# Patient Record
Sex: FEMALE | Race: ASIAN | NOT HISPANIC OR LATINO | Employment: FULL TIME | URBAN - METROPOLITAN AREA
[De-identification: names, ages, dates, MRNs, and addresses within clinical notes are randomized per-mention and may not be internally consistent; named-entity substitution may affect disease eponyms.]

---

## 2021-08-30 ENCOUNTER — APPOINTMENT (OUTPATIENT)
Dept: RADIOLOGY | Facility: MEDICAL CENTER | Age: 31
End: 2021-08-30
Payer: COMMERCIAL

## 2021-08-30 DIAGNOSIS — M99.02 THORACIC SEGMENT DYSFUNCTION: ICD-10-CM

## 2021-08-30 DIAGNOSIS — M99.04 SOMATIC DYSFUNCTION OF SACRAL REGION: ICD-10-CM

## 2021-08-30 DIAGNOSIS — M99.03 SOMATIC DYSFUNCTION OF LUMBAR REGION: ICD-10-CM

## 2021-08-30 DIAGNOSIS — S33.8XXA SACRUM SPRAIN, INITIAL ENCOUNTER: ICD-10-CM

## 2021-08-30 PROCEDURE — 72100 X-RAY EXAM L-S SPINE 2/3 VWS: CPT

## 2021-08-30 PROCEDURE — 72170 X-RAY EXAM OF PELVIS: CPT

## 2021-10-13 ENCOUNTER — CONSULT (OUTPATIENT)
Dept: PAIN MEDICINE | Facility: MEDICAL CENTER | Age: 31
End: 2021-10-13
Payer: COMMERCIAL

## 2021-10-13 VITALS
HEART RATE: 76 BPM | DIASTOLIC BLOOD PRESSURE: 75 MMHG | WEIGHT: 137 LBS | SYSTOLIC BLOOD PRESSURE: 112 MMHG | BODY MASS INDEX: 24.27 KG/M2 | HEIGHT: 63 IN

## 2021-10-13 DIAGNOSIS — G89.4 CHRONIC PAIN SYNDROME: Primary | ICD-10-CM

## 2021-10-13 DIAGNOSIS — M51.26 LUMBAR DISC HERNIATION: ICD-10-CM

## 2021-10-13 DIAGNOSIS — M51.16 LUMBAR DISC DISEASE WITH RADICULOPATHY: ICD-10-CM

## 2021-10-13 PROCEDURE — 99204 OFFICE O/P NEW MOD 45 MIN: CPT | Performed by: PHYSICAL MEDICINE & REHABILITATION

## 2021-10-13 RX ORDER — LATANOPROST 50 UG/ML
1 SOLUTION/ DROPS OPHTHALMIC
COMMUNITY
End: 2021-12-07

## 2021-10-29 ENCOUNTER — HOSPITAL ENCOUNTER (OUTPATIENT)
Dept: MRI IMAGING | Facility: HOSPITAL | Age: 31
Discharge: HOME | End: 2021-10-29
Attending: PHYSICAL MEDICINE & REHABILITATION
Payer: COMMERCIAL

## 2021-10-29 DIAGNOSIS — G89.4 CHRONIC PAIN SYNDROME: ICD-10-CM

## 2021-10-29 DIAGNOSIS — M51.26 LUMBAR DISC HERNIATION: ICD-10-CM

## 2021-10-29 DIAGNOSIS — M51.16 LUMBAR DISC DISEASE WITH RADICULOPATHY: ICD-10-CM

## 2021-10-29 PROCEDURE — 72148 MRI LUMBAR SPINE W/O DYE: CPT

## 2021-10-29 PROCEDURE — G1004 CDSM NDSC: HCPCS

## 2021-11-02 ENCOUNTER — TELEPHONE (OUTPATIENT)
Dept: PAIN MEDICINE | Facility: CLINIC | Age: 31
End: 2021-11-02

## 2021-12-01 ENCOUNTER — IMMUNIZATIONS (OUTPATIENT)
Dept: FAMILY MEDICINE CLINIC | Facility: HOSPITAL | Age: 31
End: 2021-12-01

## 2021-12-01 DIAGNOSIS — Z23 ENCOUNTER FOR IMMUNIZATION: Primary | ICD-10-CM

## 2021-12-01 PROCEDURE — 0064A COVID-19 MODERNA VACC 0.25 ML BOOSTER: CPT

## 2021-12-01 PROCEDURE — 91306 COVID-19 MODERNA VACC 0.25 ML BOOSTER: CPT

## 2021-12-07 ENCOUNTER — CONSULT (OUTPATIENT)
Dept: NEUROSURGERY | Facility: CLINIC | Age: 31
End: 2021-12-07
Payer: COMMERCIAL

## 2021-12-07 VITALS
SYSTOLIC BLOOD PRESSURE: 124 MMHG | BODY MASS INDEX: 32.07 KG/M2 | HEIGHT: 63 IN | TEMPERATURE: 98 F | HEART RATE: 68 BPM | WEIGHT: 181 LBS | DIASTOLIC BLOOD PRESSURE: 73 MMHG | RESPIRATION RATE: 16 BRPM

## 2021-12-07 DIAGNOSIS — M48.061 SPINAL STENOSIS OF LUMBAR REGION WITHOUT NEUROGENIC CLAUDICATION: ICD-10-CM

## 2021-12-07 DIAGNOSIS — M51.26 LUMBAR DISC HERNIATION: ICD-10-CM

## 2021-12-07 DIAGNOSIS — G89.4 CHRONIC PAIN SYNDROME: ICD-10-CM

## 2021-12-07 PROCEDURE — 99203 OFFICE O/P NEW LOW 30 MIN: CPT | Performed by: NEUROLOGICAL SURGERY

## 2021-12-07 RX ORDER — RIMEGEPANT SULFATE 75 MG/75MG
75 TABLET, ORALLY DISINTEGRATING ORAL
COMMUNITY
Start: 2021-10-18

## 2021-12-07 RX ORDER — LATANOPROST 50 UG/ML
SOLUTION/ DROPS OPHTHALMIC
COMMUNITY
Start: 2021-09-29

## 2022-03-16 ENCOUNTER — APPOINTMENT (OUTPATIENT)
Dept: RADIOLOGY | Facility: MEDICAL CENTER | Age: 32
End: 2022-03-16
Payer: COMMERCIAL

## 2022-03-16 ENCOUNTER — OFFICE VISIT (OUTPATIENT)
Dept: FAMILY MEDICINE CLINIC | Facility: CLINIC | Age: 32
End: 2022-03-16
Payer: COMMERCIAL

## 2022-03-16 VITALS
DIASTOLIC BLOOD PRESSURE: 68 MMHG | WEIGHT: 187 LBS | SYSTOLIC BLOOD PRESSURE: 122 MMHG | BODY MASS INDEX: 33.13 KG/M2 | TEMPERATURE: 97.4 F | HEIGHT: 63 IN | HEART RATE: 82 BPM

## 2022-03-16 DIAGNOSIS — R06.02 SOB (SHORTNESS OF BREATH): ICD-10-CM

## 2022-03-16 DIAGNOSIS — M51.37 DDD (DEGENERATIVE DISC DISEASE), LUMBOSACRAL: ICD-10-CM

## 2022-03-16 DIAGNOSIS — G43.909 MIGRAINE WITHOUT STATUS MIGRAINOSUS, NOT INTRACTABLE, UNSPECIFIED MIGRAINE TYPE: ICD-10-CM

## 2022-03-16 DIAGNOSIS — Z11.59 NEED FOR HEPATITIS C SCREENING TEST: Primary | ICD-10-CM

## 2022-03-16 DIAGNOSIS — R63.5 WEIGHT GAIN: ICD-10-CM

## 2022-03-16 DIAGNOSIS — F41.1 ANXIETY, GENERALIZED: ICD-10-CM

## 2022-03-16 DIAGNOSIS — Z13.220 LIPID SCREENING: ICD-10-CM

## 2022-03-16 DIAGNOSIS — Z87.891 HISTORY OF TOBACCO USE: ICD-10-CM

## 2022-03-16 DIAGNOSIS — F33.9 DEPRESSION, RECURRENT (HCC): ICD-10-CM

## 2022-03-16 DIAGNOSIS — Z00.00 HEALTHCARE MAINTENANCE: ICD-10-CM

## 2022-03-16 DIAGNOSIS — K58.2 IRRITABLE BOWEL SYNDROME WITH BOTH CONSTIPATION AND DIARRHEA: ICD-10-CM

## 2022-03-16 DIAGNOSIS — Z11.4 SCREENING FOR HIV (HUMAN IMMUNODEFICIENCY VIRUS): ICD-10-CM

## 2022-03-16 PROBLEM — M53.86 SCIATICA OF LEFT SIDE ASSOCIATED WITH DISORDER OF LUMBAR SPINE: Status: ACTIVE | Noted: 2022-03-16

## 2022-03-16 PROBLEM — R10.2 PELVIC PAIN IN FEMALE: Status: ACTIVE | Noted: 2020-01-30

## 2022-03-16 PROBLEM — N92.4 MENORRHAGIA, PREMENOPAUSAL: Status: RESOLVED | Noted: 2020-01-30 | Resolved: 2022-03-16

## 2022-03-16 PROBLEM — M51.379 DDD (DEGENERATIVE DISC DISEASE), LUMBOSACRAL: Status: ACTIVE | Noted: 2018-04-20

## 2022-03-16 PROBLEM — I10 HYPERTENSION: Status: ACTIVE | Noted: 2022-03-16

## 2022-03-16 PROBLEM — M51.16 LUMBAR DISC HERNIATION WITH RADICULOPATHY: Status: ACTIVE | Noted: 2017-04-01

## 2022-03-16 PROBLEM — N92.4 MENORRHAGIA, PREMENOPAUSAL: Status: ACTIVE | Noted: 2020-01-30

## 2022-03-16 PROBLEM — I10 HYPERTENSION: Status: RESOLVED | Noted: 2022-03-16 | Resolved: 2022-03-16

## 2022-03-16 PROCEDURE — 93000 ELECTROCARDIOGRAM COMPLETE: CPT | Performed by: PHYSICIAN ASSISTANT

## 2022-03-16 PROCEDURE — 71046 X-RAY EXAM CHEST 2 VIEWS: CPT

## 2022-03-16 PROCEDURE — 99204 OFFICE O/P NEW MOD 45 MIN: CPT | Performed by: PHYSICIAN ASSISTANT

## 2022-03-16 PROCEDURE — 3008F BODY MASS INDEX DOCD: CPT | Performed by: PHYSICIAN ASSISTANT

## 2022-03-16 PROCEDURE — 1036F TOBACCO NON-USER: CPT | Performed by: PHYSICIAN ASSISTANT

## 2022-03-16 PROCEDURE — 3725F SCREEN DEPRESSION PERFORMED: CPT | Performed by: PHYSICIAN ASSISTANT

## 2022-03-16 RX ORDER — OFLOXACIN 3 MG/ML
SOLUTION/ DROPS OPHTHALMIC
COMMUNITY
End: 2022-03-16 | Stop reason: ALTCHOICE

## 2022-03-16 NOTE — ASSESSMENT & PLAN NOTE
Currently stable with 3 months of therapy  Patient is declining medication at this time  No SI or HI

## 2022-03-16 NOTE — ASSESSMENT & PLAN NOTE
Patient is status post injection and has seen neurosurgery    In the future they recommend diskectomy if symptoms exacerbate or persist

## 2022-03-16 NOTE — PATIENT INSTRUCTIONS
Problem List Items Addressed This Visit        Digestive    Irritable bowel syndrome with both constipation and diarrhea     Currently stable  Cardiovascular and Mediastinum    Migraine headache     Very stable with the use of Nurtec 3 to 4 times a month  Musculoskeletal and Integument    DDD (degenerative disc disease), lumbosacral     Patient is status post injection and has seen neurosurgery  In the future they recommend diskectomy if symptoms exacerbate or persist             Other    Anxiety, generalized     Currently stable with 3 months of therapy  Patient is declining medication at this time  No SI or HI  Relevant Orders    POCT ECG (Completed)    History of tobacco use     History of tobacco abuse with shortness of breath- check chest x-ray  SOB (shortness of breath)     Symptoms intermittent at rest   This is atypical   Check blood work including D-dimer chest x-ray and echo  EKG in office WNL today  Relevant Orders    CBC and differential    Comprehensive metabolic panel    TSH, 3rd generation with Free T4 reflex    XR chest pa & lateral    Echo complete w/ contrast if indicated    D-dimer, quantitative    Depression, recurrent (HCC)     NO SI or HI  Meds not needed at this time  Pt is to continue therapy and in more time if she feels she is not making appropriate progress she would consider the next step              Other Visit Diagnoses     Need for hepatitis C screening test    -  Primary    Relevant Orders    Hepatitis C Antibody ([facility])    Screening for HIV (human immunodeficiency virus)        Relevant Orders    HIV 1/2 Antigen/Antibody (4th Generation) w Reflex [facility]    Weight gain        Relevant Orders    CBC and differential    Comprehensive metabolic panel    Lipid panel    TSH, 3rd generation with Free T4 reflex    Healthcare maintenance        Relevant Orders    Ambulatory Referral to Obstetrics / Gynecology    Lipid screening Relevant Orders    Lipid panel

## 2022-03-16 NOTE — ASSESSMENT & PLAN NOTE
Symptoms intermittent at rest   This is atypical   Check blood work including D-dimer chest x-ray and echo  EKG in office WNL today

## 2022-03-16 NOTE — PROGRESS NOTES
Assessment and Plan:    Problem List Items Addressed This Visit        Digestive    Irritable bowel syndrome with both constipation and diarrhea     Currently stable  Cardiovascular and Mediastinum    Migraine headache     Very stable with the use of Nurtec 3 to 4 times a month  Musculoskeletal and Integument    DDD (degenerative disc disease), lumbosacral     Patient is status post injection and has seen neurosurgery  In the future they recommend diskectomy if symptoms exacerbate or persist             Other    Anxiety, generalized     Currently stable with 3 months of therapy  Patient is declining medication at this time  No SI or HI  Relevant Orders    POCT ECG (Completed)    History of tobacco use     History of tobacco abuse with shortness of breath- check chest x-ray  SOB (shortness of breath)     Symptoms intermittent at rest   This is atypical   Check blood work including D-dimer chest x-ray and echo  EKG in office WNL today  Relevant Orders    CBC and differential    Comprehensive metabolic panel    TSH, 3rd generation with Free T4 reflex    XR chest pa & lateral    Echo complete w/ contrast if indicated    D-dimer, quantitative    Depression, recurrent (HCC)     NO SI or HI  Meds not needed at this time  Pt is to continue therapy and in more time if she feels she is not making appropriate progress she would consider the next step              Other Visit Diagnoses     Need for hepatitis C screening test    -  Primary    Relevant Orders    Hepatitis C Antibody ([facility])    Screening for HIV (human immunodeficiency virus)        Relevant Orders    HIV 1/2 Antigen/Antibody (4th Generation) w Reflex [facility]    Weight gain        Relevant Orders    CBC and differential    Comprehensive metabolic panel    Lipid panel    TSH, 3rd generation with Free T4 reflex    Healthcare maintenance        Relevant Orders    Ambulatory Referral to Obstetrics / Gynecology Lipid screening        Relevant Orders    Lipid panel                 Diagnoses and all orders for this visit:    Need for hepatitis C screening test  -     Hepatitis C Antibody ([facility]); Future    Screening for HIV (human immunodeficiency virus)  -     HIV 1/2 Antigen/Antibody (4th Generation) w Reflex [facility]; Future    Weight gain  -     CBC and differential  -     Comprehensive metabolic panel  -     Lipid panel  -     TSH, 3rd generation with Free T4 reflex    Healthcare maintenance  -     Ambulatory Referral to Obstetrics / Gynecology; Future    Lipid screening  -     Lipid panel    SOB (shortness of breath)  -     CBC and differential  -     Comprehensive metabolic panel  -     TSH, 3rd generation with Free T4 reflex  -     XR chest pa & lateral; Future  -     Echo complete w/ contrast if indicated; Future  -     D-dimer, quantitative; Future    Anxiety, generalized  -     POCT ECG    History of tobacco use    Migraine without status migrainosus, not intractable, unspecified migraine type    Irritable bowel syndrome with both constipation and diarrhea    DDD (degenerative disc disease), lumbosacral    Depression, recurrent (HCC)    Other orders  -     Discontinue: ofloxacin (OCUFLOX) 0 3 % ophthalmic solution; ofloxacin 0 3 % eye drops   INSTILL 1 DROP INTO AFFECTED EYE(S) BY OPHTHALMIC ROUTE 4 TIMES PER DAY              Subjective:      Patient ID: Malinda Pan is a 32 y o  female  CC:    Chief Complaint   Patient presents with    Establish Care     Pt is here to establish care, states she has not had an exam in the past 2/3 years  Was seen in [address] [place] [address] before this   Shortness of Breath     not related to physical activity, ongoing for the past 6 months   Weight Check     "weight gain"     Blood Pressure Check     states she has had concerning b/p readings in the past, does check b/p at home with elevated reading   fam hx of hypertension     Anxiety    Depression     does got to therapist  + depression screening  HPI:    NP here to be established  Patient moved to the [facility] from [place] and this summer she does has not been able to get in touch with an hooked up with a primary care provider  She has history of anxiety she has been through counseling now continually for the past 3 months and she states that she would like to see how this works before she with even think about starting medication  No SI or HI even though she states she is a little bit depressed  Her family has been on our half away from this area she moved here due to her boyfriend whose family is also not in the area  Patient has noted that over time she is gaining weight  Blood work in the system I can see from 2 years ago was normal but she is looking to get a new fresh set  Patient has a history of smoking for 10 years but has been a nonsmoker for quite some time now  One of her main complaints today also is that she is feeling this sensation of being short of breath mostly while at rest and not upon exertion she states it is really difficult to describe but she feels that something is in her lungs when she feels short of breath and it feels difficult to breathe although she is not coughing wheezing note palpitations racing heart nausea sweats lightheadedness dizziness or headaches during this time she states that last week was the worst episode she had lasted about 12 hours  She states again that she has no chest pain or chest tightness but just feels that something is in her chest     Also patient does have anxiety and when she checks her blood pressure at home her blood pressure readings on her home cuff are extremely elevated    She states that even when she goes the doctor if she has been rushing around trying to catch transportation before she has her blood pressure taken it is elevated and then when she becomes calmer it does go back down to normal       The following portions of the patient's history were reviewed and updated as appropriate: allergies, current medications, past family history, past medical history, past social history, past surgical history and problem list       Review of Systems   Constitutional: Positive for unexpected weight change  HENT: Negative  Eyes: Negative  Respiratory: Positive for shortness of breath  Cardiovascular: Negative  Gastrointestinal: Negative  Endocrine: Negative  Genitourinary: Negative  Musculoskeletal: Negative  Skin: Negative  Allergic/Immunologic: Negative  Neurological: Negative  Hematological: Negative  Psychiatric/Behavioral: Negative  Data to review:       Objective:    Vitals:    03/16/22 1021   BP: 122/68   BP Location: Right arm   Patient Position: Sitting   Cuff Size: Adult   Pulse: 82   Temp: (!) 97 4 °F (36 3 °C)   TempSrc: Temporal   Weight: 84 8 kg (187 lb)   Height: 5' 3" (1 6 m)        Physical Exam  Vitals and nursing note reviewed  Constitutional:       Appearance: Normal appearance  She is well-developed  HENT:      Head: Normocephalic and atraumatic  Eyes:      General: Lids are normal       Conjunctiva/sclera: Conjunctivae normal       Pupils: Pupils are equal, round, and reactive to light  Cardiovascular:      Rate and Rhythm: Normal rate and regular rhythm  Heart sounds: No murmur heard  Pulmonary:      Effort: Pulmonary effort is normal       Breath sounds: Normal breath sounds  Skin:     General: Skin is warm and dry  Neurological:      General: No focal deficit present  Mental Status: She is alert  Coordination: Coordination is intact  Psychiatric:         Mood and Affect: Mood normal          Behavior: Behavior normal  Behavior is cooperative  Thought Content: Thought content normal          Judgment: Judgment normal          BMI Counseling: Body mass index is 33 13 kg/m²   The BMI is above normal  Nutrition recommendations include reducing portion sizes, decreasing overall calorie intake, 3-5 servings of fruits/vegetables daily, reducing fast food intake, consuming healthier snacks, decreasing soda and/or juice intake, moderation in carbohydrate intake, increasing intake of lean protein, reducing intake of saturated fat and trans fat and reducing intake of cholesterol  Depression Screening Follow-up Plan: Patient's depression screening was positive with a PHQ-2 score of 4  Their PHQ-9 score was 19  Patient advised to follow-up with PCP for further management  BMI Counseling: Body mass index is 33 13 kg/m²  The BMI is above normal  Nutrition recommendations include decreasing portion sizes, encouraging healthy choices of fruits and vegetables, decreasing fast food intake, consuming healthier snacks, limiting drinks that contain sugar, moderation in carbohydrate intake, increasing intake of lean protein, reducing intake of saturated and trans fat and reducing intake of cholesterol  Exercise recommendations include exercising 3-5 times per week  No pharmacotherapy was ordered  Rationale for BMI follow-up plan is due to patient being overweight or obese  Depression Screening and Follow-up Plan: Patient's depression screening was positive with a PHQ-2 score of 4  Their PHQ-9 score was 19  Patient assessed for underlying major depression  Brief counseling provided and recommend additional follow-up/re-evaluation next office visit

## 2022-03-16 NOTE — ASSESSMENT & PLAN NOTE
NO SI or HI  Meds not needed at this time  Pt is to continue therapy and in more time if she feels she is not making appropriate progress she would consider the next step

## 2022-04-01 LAB
ALBUMIN SERPL-MCNC: 4.4 G/DL (ref 3.8–4.8)
ALBUMIN/GLOB SERPL: 1.5 {RATIO} (ref 1.2–2.2)
ALP SERPL-CCNC: 71 IU/L (ref 44–121)
ALT SERPL-CCNC: 18 IU/L (ref 0–32)
AST SERPL-CCNC: 22 IU/L (ref 0–40)
BASOPHILS # BLD AUTO: 0 X10E3/UL (ref 0–0.2)
BASOPHILS NFR BLD AUTO: 1 %
BILIRUB SERPL-MCNC: 0.3 MG/DL (ref 0–1.2)
BUN SERPL-MCNC: 14 MG/DL (ref 6–20)
BUN/CREAT SERPL: 15 (ref 9–23)
CALCIUM SERPL-MCNC: 9.4 MG/DL (ref 8.7–10.2)
CHLORIDE SERPL-SCNC: 101 MMOL/L (ref 96–106)
CHOLEST SERPL-MCNC: 173 MG/DL (ref 100–199)
CO2 SERPL-SCNC: 18 MMOL/L (ref 20–29)
CREAT SERPL-MCNC: 0.92 MG/DL (ref 0.57–1)
D DIMER PPP FEU-MCNC: 0.31 MG/L FEU (ref 0–0.49)
EGFR: 85 ML/MIN/1.73
EOSINOPHIL # BLD AUTO: 0.1 X10E3/UL (ref 0–0.4)
EOSINOPHIL NFR BLD AUTO: 1 %
ERYTHROCYTE [DISTWIDTH] IN BLOOD BY AUTOMATED COUNT: 12.7 % (ref 11.7–15.4)
GLOBULIN SER-MCNC: 2.9 G/DL (ref 1.5–4.5)
GLUCOSE SERPL-MCNC: 81 MG/DL (ref 65–99)
HCT VFR BLD AUTO: 39.6 % (ref 34–46.6)
HCV AB S/CO SERPL IA: <0.1 S/CO RATIO (ref 0–0.9)
HDLC SERPL-MCNC: 57 MG/DL
HGB BLD-MCNC: 13.6 G/DL (ref 11.1–15.9)
HIV 1+2 AB+HIV1 P24 AG SERPL QL IA: NON REACTIVE
IMM GRANULOCYTES # BLD: 0 X10E3/UL (ref 0–0.1)
IMM GRANULOCYTES NFR BLD: 0 %
LDLC SERPL CALC-MCNC: 96 MG/DL (ref 0–99)
LYMPHOCYTES # BLD AUTO: 2.2 X10E3/UL (ref 0.7–3.1)
LYMPHOCYTES NFR BLD AUTO: 29 %
MCH RBC QN AUTO: 30.4 PG (ref 26.6–33)
MCHC RBC AUTO-ENTMCNC: 34.3 G/DL (ref 31.5–35.7)
MCV RBC AUTO: 88 FL (ref 79–97)
MONOCYTES # BLD AUTO: 0.4 X10E3/UL (ref 0.1–0.9)
MONOCYTES NFR BLD AUTO: 6 %
NEUTROPHILS # BLD AUTO: 4.9 X10E3/UL (ref 1.4–7)
NEUTROPHILS NFR BLD AUTO: 63 %
PLATELET # BLD AUTO: 333 X10E3/UL (ref 150–450)
POTASSIUM SERPL-SCNC: 4.4 MMOL/L (ref 3.5–5.2)
PROT SERPL-MCNC: 7.3 G/DL (ref 6–8.5)
RBC # BLD AUTO: 4.48 X10E6/UL (ref 3.77–5.28)
SL AMB VLDL CHOLESTEROL CALC: 20 MG/DL (ref 5–40)
SODIUM SERPL-SCNC: 136 MMOL/L (ref 134–144)
TRIGL SERPL-MCNC: 111 MG/DL (ref 0–149)
TSH SERPL DL<=0.005 MIU/L-ACNC: 2.5 UIU/ML (ref 0.45–4.5)
WBC # BLD AUTO: 7.7 X10E3/UL (ref 3.4–10.8)

## 2022-04-03 NOTE — RESULT ENCOUNTER NOTE
Please let pt know her blood work was great  Next item to review is her ECHO was is scheduled  CXR was WNL

## 2022-04-05 ENCOUNTER — HOSPITAL ENCOUNTER (OUTPATIENT)
Dept: NON INVASIVE DIAGNOSTICS | Facility: CLINIC | Age: 32
Discharge: HOME | End: 2022-04-05
Payer: COMMERCIAL

## 2022-04-05 VITALS
WEIGHT: 187 LBS | DIASTOLIC BLOOD PRESSURE: 68 MMHG | HEART RATE: 80 BPM | BODY MASS INDEX: 33.13 KG/M2 | HEIGHT: 63 IN | SYSTOLIC BLOOD PRESSURE: 122 MMHG

## 2022-04-05 DIAGNOSIS — R06.02 SOB (SHORTNESS OF BREATH): ICD-10-CM

## 2022-04-05 LAB
AORTIC ROOT: 2.4 CM
APICAL FOUR CHAMBER EJECTION FRACTION: 55 %
AV REGURGITATION PRESSURE HALF TIME: 465 MS
E WAVE DECELERATION TIME: 198 MS
FRACTIONAL SHORTENING: 29 % (ref 28–44)
INTERVENTRICULAR SEPTUM IN DIASTOLE (PARASTERNAL SHORT AXIS VIEW): 0.8 CM
INTERVENTRICULAR SEPTUM: 0.8 CM (ref 0.53–0.99)
LEFT ATRIUM AREA SYSTOLE SINGLE PLANE A4C: 18.9 CM2
LEFT ATRIUM SIZE: 3.1 CM
LEFT INTERNAL DIMENSION IN SYSTOLE: 3.2 CM (ref 2.86–4.32)
LEFT VENTRICULAR INTERNAL DIMENSION IN DIASTOLE: 4.5 CM (ref 4.69–6.98)
LEFT VENTRICULAR POSTERIOR WALL IN END DIASTOLE: 0.8 CM (ref 0.52–0.98)
LEFT VENTRICULAR STROKE VOLUME: 54 ML
LVSV (TEICH): 54 ML
MV E'TISSUE VEL-SEP: 14 CM/S
MV PEAK A VEL: 0.63 M/S
MV PEAK E VEL: 96 CM/S
MV STENOSIS PRESSURE HALF TIME: 57 MS
MV VALVE AREA P 1/2 METHOD: 3.86 CM2
PULMONARY REGURGITATION LATE DIASTOLIC VELOCITY: 0.02 M/S
RIGHT ATRIUM AREA SYSTOLE A4C: 14.2 CM2
RIGHT VENTRICLE ID DIMENSION: 3.5 CM
SL CV AV DECELERATION TIME RETROGRADE: 1604 MS
SL CV AV PEAK GRADIENT RETROGRADE: 79 MMHG
SL CV LV EF: 60
SL CV PED ECHO LEFT VENTRICLE DIASTOLIC VOLUME (MOD BIPLANE) 2D: 93 ML
SL CV PED ECHO LEFT VENTRICLE SYSTOLIC VOLUME (MOD BIPLANE) 2D: 40 ML
TR MAX PG: 27 MMHG
TR PEAK VELOCITY: 2.6 M/S
TRICUSPID VALVE PEAK REGURGITATION VELOCITY: 2.58 M/S
Z-SCORE OF INTERVENTRICULAR SEPTUM IN END DIASTOLE: 0.32
Z-SCORE OF LEFT VENTRICULAR DIMENSION IN END DIASTOLE: -2.39
Z-SCORE OF LEFT VENTRICULAR DIMENSION IN END SYSTOLE: -0.74
Z-SCORE OF LEFT VENTRICULAR POSTERIOR WALL IN END DIASTOLE: 0.43

## 2022-04-05 PROCEDURE — 93306 TTE W/DOPPLER COMPLETE: CPT

## 2022-04-05 PROCEDURE — 93306 TTE W/DOPPLER COMPLETE: CPT | Performed by: INTERNAL MEDICINE

## 2022-07-18 ENCOUNTER — OFFICE VISIT (OUTPATIENT)
Dept: OBGYN CLINIC | Facility: CLINIC | Age: 32
End: 2022-07-18
Payer: COMMERCIAL

## 2022-07-18 VITALS
HEIGHT: 63 IN | SYSTOLIC BLOOD PRESSURE: 110 MMHG | BODY MASS INDEX: 34.94 KG/M2 | WEIGHT: 197.2 LBS | DIASTOLIC BLOOD PRESSURE: 80 MMHG

## 2022-07-18 DIAGNOSIS — Z12.4 ENCOUNTER FOR PAPANICOLAOU SMEAR FOR CERVICAL CANCER SCREENING: ICD-10-CM

## 2022-07-18 DIAGNOSIS — Z11.51 SCREENING FOR HPV (HUMAN PAPILLOMAVIRUS): ICD-10-CM

## 2022-07-18 DIAGNOSIS — Z01.419 ENCOUNTER FOR GYNECOLOGICAL EXAMINATION (GENERAL) (ROUTINE) WITHOUT ABNORMAL FINDINGS: Primary | ICD-10-CM

## 2022-07-18 PROCEDURE — 99385 PREV VISIT NEW AGE 18-39: CPT | Performed by: NURSE PRACTITIONER

## 2022-07-18 PROCEDURE — 0503F POSTPARTUM CARE VISIT: CPT | Performed by: NURSE PRACTITIONER

## 2022-07-18 NOTE — PATIENT INSTRUCTIONS
Weight Management   WHAT YOU NEED TO KNOW:   Being overweight increases your risk of health conditions such as heart disease, high blood pressure, type 2 diabetes, and certain types of cancer  It can also increase your risk for osteoarthritis, sleep apnea, and other respiratory problems  Aim for a slow, steady weight loss  Even a small amount of weight loss can lower your risk of health problems  DISCHARGE INSTRUCTIONS:   How to lose weight safely:  A safe and healthy way to lose weight is to eat fewer calories and get regular exercise  You can lose up about 1 pound a week by decreasing the number of calories you eat by 500 calories each day  You can decrease calories by eating smaller portion sizes or by cutting out high-calorie foods  Read labels to find out how many calories are in the foods you eat  You can also burn calories with exercise such as walking, swimming, or biking  You will be more likely to keep weight off if you make these changes part of your lifestyle  Exercise at least 30 minutes per day on most days of the week  You can also fit in more physical activity by taking the stairs instead of the elevator or parking farther away from stores  Ask your healthcare provider about the best exercise plan for you  Healthy meal plan for weight management:  A healthy meal plan includes a variety of foods, contains fewer calories, and helps you stay healthy  A healthy meal plan includes the following:     Eat whole-grain foods more often  A healthy meal plan should contain fiber  Fiber is the part of grains, fruits, and vegetables that is not broken down by your body  Whole-grain foods are healthy and provide extra fiber in your diet  Some examples of whole-grain foods are whole-wheat breads and pastas, oatmeal, brown rice, and bulgur  Eat a variety of vegetables every day  Include dark, leafy greens such as spinach, kale, esme greens, and mustard greens   Eat yellow and orange vegetables such as carrots, sweet potatoes, and winter squash  Eat a variety of fruits every day  Choose fresh or canned fruit (canned in its own juice or light syrup) instead of juice  Fruit juice has very little or no fiber  Eat low-fat dairy foods  Drink fat-free (skim) milk or 1% milk  Eat fat-free yogurt and low-fat cottage cheese  Try low-fat cheeses such as mozzarella and other reduced-fat cheeses  Choose meat and other protein foods that are low in fat  Choose beans or other legumes such as split peas or lentils  Choose fish, skinless poultry (chicken or turkey), or lean cuts of red meat (beef or pork)  Before you cook meat or poultry, cut off any visible fat  Use less fat and oil  Try baking foods instead of frying them  Add less fat, such as margarine, sour cream, regular salad dressing, and mayonnaise to foods  Eat fewer high-fat foods  Some examples of high-fat foods include french fries, doughnuts, ice cream, and cakes  Eat fewer sweets  Limit foods and drinks that are high in sugar  This includes candy, cookies, regular soda, and sweetened drinks  Ways to decrease calories:   Eat smaller portions  Use a small plate with smaller servings  Do not eat second helpings  When you eat at a restaurant, ask for a box and place half of your meal in the box before you eat  Share an entrée with someone else  Replace high-calorie snacks with healthy, low-calorie snacks  Choose fresh fruit, vegetables, fat-free rice cakes, or air-popped popcorn instead of potato chips, nuts, or chocolate  Choose water or calorie-free drinks instead of soda or sweetened drinks  Do not shop for groceries when you are hungry  You may be more likely to make unhealthy food choices  Take a grocery list of healthy foods and shop after you have eaten  Eat regular meals  Do not skip meals  Skipping meals can lead to overeating later in the day  This can make it harder for you to lose weight   Eat a healthy snack in place of a meal if you do not have time to eat a regular meal  Talk with a dietitian to help you create a meal plan and schedule that is right for you  Other things to consider as you try to lose weight:   Be aware of situations that may give you the urge to overeat, such as eating while watching television  Find ways to avoid these situations  For example, read a book, go for a walk, or do crafts  Meet with a weight loss support group or friends who are also trying to lose weight  This may help you stay motivated to continue working on your weight loss goals  © Copyright [employer] 2022 Information is for End User's use only and may not be sold, redistributed or otherwise used for commercial purposes  All illustrations and images included in CareNotes® are the copyrighted property of A D A M [employer] Inc  or Anabel Cruz  The above information is an  only  It is not intended as medical advice for individual conditions or treatments  Talk to your doctor, nurse or pharmacist before following any medical regimen to see if it is safe and effective for you

## 2022-07-18 NOTE — PROGRESS NOTES
Assessment / Plan    1  Encounter for gynecological examination (general) (routine) without abnormal findings  Normal well woman exam  Pap with HPV obtained  NFP for [facility]    2  Encounter for Papanicolaou smear for cervical cancer screening    - IGP, Aptima HPV, Rfx 16/18,45    3  Screening for HPV (human papillomavirus)    - IGP, Aptima HPV, Rfx 16/18,45      Subjective      Sonia Abad is a 32 y o  female who presents for her annual gynecologic exam     33 yo NEW PATIENT presents for routine GYN exam     Unable to use estrogen containing birth control-- hx migraines with which she developed aura when on KATHY   2021 Neplanon  2021 Nexplanon removal-- side effects, anxiety  Tried Mirena in past and it expelled  Now using NFP   2019 pap negative      Periods are regular   Current contraception: rhythm method  History of abnormal Pap smear: no  Family history of breast,uterine, ovarian or colon cancer: no      Menstrual History:  OB History        0    Para   0    Term   0       0    AB   0    Living   0       SAB   0    IAB   0    Ectopic   0    Multiple   0    Live Births   0           Obstetric Comments   Menarche 13  Menses Q 28-30d, x 5-6d, heavy x 3, then light  Manageable cramps  Patient's last menstrual period was 2022  The following portions of the patient's history were reviewed and updated as appropriate: allergies, current medications, past family history, past medical history, past social history, past surgical history and problem list     Review of Systems      Review of Systems   Constitutional: Negative for chills and fever  Respiratory: Negative for cough and shortness of breath  Gastrointestinal: Negative for abdominal distention, abdominal pain, blood in stool, constipation, diarrhea, nausea and vomiting     Genitourinary: Negative for difficulty urinating, dysuria, frequency, genital sores, hematuria, menstrual problem, pelvic pain, urgency, vaginal bleeding and vaginal discharge  Musculoskeletal: Negative for arthralgias and myalgias  Breasts:  Negative for skin changes, dimpling, asymmetry, nipple discharge, redness, tenderness or palpable masses    Objective      /80 (BP Location: Right arm, Patient Position: Sitting, Cuff Size: Standard)   Ht 5' 3" (1 6 m)   Wt 89 4 kg (197 lb 3 2 oz)   LMP 06/28/2022   BMI 34 93 kg/m²   Physical Exam  Constitutional:       General: She is not in acute distress  Appearance: Normal appearance  She is well-developed  She is obese  She is not ill-appearing or diaphoretic  HENT:      Head: Normocephalic and atraumatic  Eyes:      Pupils: Pupils are equal, round, and reactive to light  Neck:      Thyroid: No thyromegaly  Pulmonary:      Effort: Pulmonary effort is normal    Chest:   Breasts: Breasts are symmetrical       Right: No inverted nipple, mass, nipple discharge, skin change, tenderness or supraclavicular adenopathy  Left: No inverted nipple, mass, nipple discharge, skin change, tenderness or supraclavicular adenopathy  Abdominal:      General: There is no distension  Palpations: Abdomen is soft  There is no mass  Tenderness: There is no abdominal tenderness  There is no guarding or rebound  Genitourinary:     General: Normal vulva  Exam position: Lithotomy position  Labia:         Right: No rash, tenderness, lesion or injury  Left: No rash, tenderness, lesion or injury  Vagina: No signs of injury and foreign body  No vaginal discharge, erythema, tenderness or bleeding  Cervix: No cervical motion tenderness, discharge or friability  Uterus: Not enlarged and not tender  Adnexa:         Right: No mass or tenderness  Left: No mass or tenderness  Musculoskeletal:      Cervical back: Neck supple  Lymphadenopathy:      Cervical: No cervical adenopathy        Upper Body:      Right upper body: No supraclavicular adenopathy  Left upper body: No supraclavicular adenopathy  Skin:     General: Skin is warm and dry  Neurological:      General: No focal deficit present  Mental Status: She is alert and oriented to person, place, and time  Psychiatric:         Mood and Affect: Mood normal          Behavior: Behavior normal          Thought Content:  Thought content normal          Judgment: Judgment normal

## 2022-07-22 LAB
CYTOLOGIST CVX/VAG CYTO: ABNORMAL
DX ICD CODE: ABNORMAL
DX ICD CODE: ABNORMAL
HPV I/H RISK 4 DNA CVX QL PROBE+SIG AMP: POSITIVE
OTHER STN SPEC: ABNORMAL
PATH REPORT.FINAL DX SPEC: ABNORMAL
PATHOLOGIST CVX/VAG CYTO: ABNORMAL
RECOM F/U CVX/VAG CYTO: ABNORMAL
SL AMB NOTE:: ABNORMAL
SL AMB SPECIMEN ADEQUACY: ABNORMAL
SL AMB TEST METHODOLOGY: ABNORMAL

## 2022-07-25 ENCOUNTER — TELEPHONE (OUTPATIENT)
Dept: OBGYN CLINIC | Facility: CLINIC | Age: 32
End: 2022-07-25

## 2022-07-25 NOTE — TELEPHONE ENCOUNTER
Spoke to patient regarding her ASCUS with positive HPV pap result  Explained nature of abnormal paps and HPV and need to further evaluate via colposcopy  Patient expressed understanding and agrees to the procedure  Advised her that I will have staff member call her to schedule  Please call patient to schedule colposcopy  Thank you

## 2022-07-25 NOTE — TELEPHONE ENCOUNTER
Patient is scheduled for 10/18/2022 for her colposcopy  Informed pt she would be called if something sooner becomes available, also pt mentioned she would be on her menstrual cycle (heavy) that day  Please let me know if you would like her to be seen sooner  Thank you

## 2022-07-25 NOTE — RESULT ENCOUNTER NOTE
33 yo w/ ASCUS / + HPV  Order was for HPV with reflex to 16/18 and 45  Please contact [facility] to have them run the reflex subtypes

## 2022-07-25 NOTE — TELEPHONE ENCOUNTER
I Rescheduled her colposcopy to 11/09/2022, due to heavy menstrual cycle  interference w/clear visual during examination/procedure  Patient was informed of placement if an opening occured sooner

## 2022-07-25 NOTE — TELEPHONE ENCOUNTER
It is ok to wait until then, but please put her on a call back list if an opening occurs sooner  Also preferable not to have done if she is bleeding heavily so I can get a good look at her cervix

## 2022-08-16 LAB
HPV16 DNA CVX QL PROBE+SIG AMP: NEGATIVE
HPV18+45 E6+E7 MRNA CVX QL NAA+PROBE: NEGATIVE

## 2022-09-14 ENCOUNTER — OFFICE VISIT (OUTPATIENT)
Dept: FAMILY MEDICINE CLINIC | Facility: CLINIC | Age: 32
End: 2022-09-14
Payer: COMMERCIAL

## 2022-09-14 VITALS
BODY MASS INDEX: 35.26 KG/M2 | SYSTOLIC BLOOD PRESSURE: 118 MMHG | WEIGHT: 199 LBS | DIASTOLIC BLOOD PRESSURE: 70 MMHG | HEART RATE: 78 BPM | TEMPERATURE: 97.3 F | HEIGHT: 63 IN

## 2022-09-14 DIAGNOSIS — K21.9 GASTROESOPHAGEAL REFLUX DISEASE WITHOUT ESOPHAGITIS: Primary | ICD-10-CM

## 2022-09-14 DIAGNOSIS — R19.7 DIARRHEA, UNSPECIFIED TYPE: ICD-10-CM

## 2022-09-14 PROCEDURE — 99214 OFFICE O/P EST MOD 30 MIN: CPT | Performed by: PHYSICIAN ASSISTANT

## 2022-09-14 NOTE — ASSESSMENT & PLAN NOTE
Patient instructed to obtain a fecal sample for a stool for O&P, H  Pylori infection, Giardia infection, a possible C  Diff PCR  Patient understands the reasoning behind obtaining a fecal sample  Educated that the sample takes about 1 week for culture results to return  Patient agrees to treatment plan above

## 2022-09-14 NOTE — ASSESSMENT & PLAN NOTE
Patient instructed to take OTC Pepcid twice daily  Encouraged to continue taking OTC Prilosec once daily to management of symptoms  Educated to limit certain foods known to exacerbate symptoms of GERD  Patient given handout at checkout with a list of foods to eat and to avoid with GERD  Encouraged to remain elevated 2-3 hours after eating  Will check stool for h pylori and check in 2-3 weeks

## 2022-09-14 NOTE — PROGRESS NOTES
Assessment and Plan:    Problem List Items Addressed This Visit        Digestive    Gastroesophageal reflux disease without esophagitis - Primary     Patient instructed to take OTC Pepcid twice daily  Encouraged to continue taking OTC Prilosec once daily to management of symptoms  Educated to limit certain foods known to exacerbate symptoms of GERD  Patient given handout at checkout with a list of foods to eat and to avoid with GERD  Encouraged to remain elevated 2-3 hours after eating  Will check stool for h pylori and check in 2-3 weeks  Relevant Orders    H  pylori antigen, stool    Ova and parasite examination    Stool Enteric Bacterial Panel by PCR    Clostridium difficile toxin by PCR with EIA    Giardia antigen       Other    Diarrhea     Patient instructed to obtain a fecal sample for a stool for O&P, H  Pylori infection, Giardia infection, a possible C  Diff PCR  Patient understands the reasoning behind obtaining a fecal sample  Educated that the sample takes about 1 week for culture results to return  Patient agrees to treatment plan above  Relevant Orders    H  pylori antigen, stool    Ova and parasite examination    Stool Enteric Bacterial Panel by PCR    Clostridium difficile toxin by PCR with EIA    Giardia antigen                 Diagnoses and all orders for this visit:    Gastroesophageal reflux disease without esophagitis  -     H  pylori antigen, stool; Future  -     Ova and parasite examination; Future  -     Stool Enteric Bacterial Panel by PCR; Future  -     Clostridium difficile toxin by PCR with EIA; Future  -     Giardia antigen; Future    Diarrhea, unspecified type  -     H  pylori antigen, stool; Future  -     Ova and parasite examination; Future  -     Stool Enteric Bacterial Panel by PCR; Future  -     Clostridium difficile toxin by PCR with EIA; Future  -     Giardia antigen; Future            Subjective:      Patient ID: Stephanie Salomon is a 28 y o  female  CC:    Chief Complaint   Patient presents with    Heartburn     Started two weeks ago, pt states this is a daily issue  Has been using OTC Prilosec 20mg daily after lunch  Pt states she started taking this medication about 5 days ago  Does admit to it having somewhat of a positive effect      Diarrhea     Pt states this happens every other day,     Blood Sugar Problem     Pt states if she has a lot of sugar intake she has " sugar crashes" where she is feeling dizzy and unable to stand  Pt states she has a fam hx of DM        HPI:    Patient is a 28year old female who presents to the outpatient office with a CC "acid reflux and diarrhea" x 2 weeks  Patient states that she has been experiencing a quick onset of acid reflux after eating  She even experiences it after eating bland foods  She denies any specific triggers with food  Patient has tried to take a TUMS after every meal but this has provided her no relief of symptoms  She has also tired a magnesium oral suspension which has provided her some relief  She states she has been taking Prilosec one a day which she has started about 5 days ago  Patient admits that her symptoms have worsened after eating a very spicy meal at a [employer] a few weeks ago which she ate left overs of thereafter as well  Her symptoms remained a few days after consuming the spicey meal as well  Patient additionally complains of loose stools which is out of the normal for her  Patient states that her bowels range from watery like a ketchup consistency or like mashed potato like  She does admit to having a cat in her home as well as eating raw sushi occasionally  No blood or mucus in her stool  No recent antibiotic use  No pain or cramping with or before BM  Not formed  These symptoms started prior to the reflux but did get worse since reflux started   She does not live with any one    nurtec always works for [employer]       The following portions of the patient's history were reviewed and updated as appropriate: allergies, current medications, past family history, past medical history, past social history, past surgical history and problem list       Review of Systems   Constitutional: Negative  HENT: Negative  Eyes: Negative  Respiratory: Negative  Cardiovascular: Negative  Gastrointestinal: Positive for diarrhea  Heartburn   Endocrine: Negative  Genitourinary: Negative  Musculoskeletal: Negative  Skin: Negative  Allergic/Immunologic: Negative  Neurological: Negative  Hematological: Negative  Psychiatric/Behavioral: Negative  All other systems reviewed and are negative  Data to review:       Objective:    Vitals:    09/14/22 0807   BP: 118/70   BP Location: Left arm   Patient Position: Sitting   Cuff Size: Adult   Pulse: 78   Temp: (!) 97 3 °F (36 3 °C)   TempSrc: Probe   Weight: 90 3 kg (199 lb)   Height: 5' 3" (1 6 m)        Physical Exam  Vitals reviewed  Constitutional:       Appearance: Normal appearance  HENT:      Head: Normocephalic  Right Ear: External ear normal       Left Ear: External ear normal    Eyes:      Extraocular Movements: Extraocular movements intact  Pupils: Pupils are equal, round, and reactive to light  Cardiovascular:      Rate and Rhythm: Normal rate and regular rhythm  Pulses: Normal pulses  Heart sounds: Normal heart sounds  Pulmonary:      Effort: Pulmonary effort is normal       Breath sounds: Normal breath sounds  Abdominal:      General: Abdomen is protuberant  Bowel sounds are normal       Palpations: Abdomen is soft  Tenderness: There is no abdominal tenderness  Musculoskeletal:         General: Normal range of motion  Cervical back: Normal range of motion  Skin:     General: Skin is warm  Neurological:      Mental Status: She is alert and oriented to person, place, and time     Psychiatric:         Mood and Affect: Mood normal  Behavior: Behavior normal          Thought Content:  Thought content normal          Judgment: Judgment normal

## 2022-09-14 NOTE — PATIENT INSTRUCTIONS
Problem List Items Addressed This Visit          Digestive    Gastroesophageal reflux disease without esophagitis - Primary     Patient instructed to take OTC Pepcid twice daily  Encouraged to continue taking OTC Prilosec once daily to management of symptoms  Educated to limit certain foods known to exacerbate symptoms of GERD  Patient given handout at checkout with a list of foods to eat and to avoid with GERD  Encouraged to remain elevated 2-3 hours after eating  Will check stool for h pylori and check in 2-3 weeks  Relevant Orders    H  pylori antigen, stool    Ova and parasite examination    Stool Enteric Bacterial Panel by PCR    Clostridium difficile toxin by PCR with EIA    Giardia antigen       Other    Diarrhea     Patient instructed to obtain a fecal sample for a stool for O&P, H  Pylori infection, Giardia infection, a possible C  Diff PCR  Patient understands the reasoning behind obtaining a fecal sample  Educated that the sample takes about 1 week for culture results to return  Patient agrees to treatment plan above  Relevant Orders    H  pylori antigen, stool    Ova and parasite examination    Stool Enteric Bacterial Panel by PCR    Clostridium difficile toxin by PCR with EIA    Giardia antigen              GERD (Gastroesophageal Reflux Disease)   WHAT YOU NEED TO KNOW:   Gastroesophageal reflux disease (GERD) is reflux that happens more than 2 times a week for a few weeks  Reflux means acid and food in your stomach back up into your esophagus  GERD can cause other health problems over time if it is not treated  DISCHARGE INSTRUCTIONS:   Call your local emergency number (911 in the [address] [place] [address],[address]) if:   You have severe chest pain and sudden trouble breathing  Return to the emergency department if:   You have trouble breathing after you vomit  You have trouble swallowing, or pain with swallowing  Your bowel movements are black, bloody, or tarry-looking      Your vomit looks like coffee grounds or has blood in it  Call your doctor or gastroenterologist if:   You feel full and cannot burp or vomit  You vomit large amounts, or you vomit often  You are losing weight without trying  Your symptoms get worse or do not improve with treatment  You have questions or concerns about your condition or care  Medicines:   Medicines  are used to decrease stomach acid  Medicine may also be used to help your lower esophageal sphincter and stomach contract (tighten) more  Take your medicine as directed  Contact your healthcare provider if you think your medicine is not helping or if you have side effects  Tell him of her if you are allergic to any medicine  Keep a list of the medicines, vitamins, and herbs you take  Include the amounts, and when and why you take them  Bring the list or the pill bottles to follow-up visits  Carry your medicine list with you in case of an emergency  Manage GERD:       Do not have foods or drinks that may increase heartburn  These include chocolate, peppermint, fried or fatty foods, drinks that contain caffeine, or carbonated drinks (soda)  Other foods include spicy foods, onions, tomatoes, and tomato-based foods  Do not have foods or drinks that can irritate your esophagus, such as citrus fruits, juices, and alcohol  Do not eat large meals  When you eat a lot of food at one time, your stomach needs more acid to digest it  Eat 6 small meals each day instead of 3 large ones, and eat slowly  Do not eat meals 2 to 3 hours before bedtime  Elevate the head of your bed  Place 6-inch blocks under the head of your bed frame  You may also use more than one pillow under your head and shoulders while you sleep  Maintain a healthy weight  If you are overweight, weight loss may help relieve symptoms of GERD  Do not smoke  Smoking weakens the lower esophageal sphincter and increases the risk of GERD   Ask your healthcare provider for information if you currently smoke and need help to quit  E-cigarettes or smokeless tobacco still contain nicotine  Talk to your healthcare provider before you use these products  Do not put pressure on your abdomen  Pressure pushes acid up into your esophagus  Do not wear clothing that is tight around your waist  Do not bend over  Bend at the knees if you need to pick something up  Follow up with your doctor or gastroenterologist as directed:  Write down your questions so you remember to ask them during your visits  © Copyright [employer] 2022 Information is for End User's use only and may not be sold, redistributed or otherwise used for commercial purposes  All illustrations and images included in CareNotes® are the copyrighted property of A D A M , Inc  or [facility] Eunice marisol   The above information is an  only  It is not intended as medical advice for individual conditions or treatments  Talk to your doctor, nurse or pharmacist before following any medical regimen to see if it is safe and effective for you

## 2022-09-14 NOTE — PROGRESS NOTES
Assessment and Plan:    Problem List Items Addressed This Visit        Digestive    Gastroesophageal reflux disease without esophagitis - Primary     Patient instructed to take OTC Pepcid twice daily  Encouraged to continue taking OTC Prilosec once daily to management of symptoms  Educated to limit certain foods known to exacerbate symptoms of GERD  Patient given handout at checkout with a list of foods to eat and to avoid with GERD  Encouraged to remain elevated 2-3 hours after eating  Relevant Orders    H  pylori antigen, stool    Ova and parasite examination    Stool Enteric Bacterial Panel by PCR    Clostridium difficile toxin by PCR with EIA    Giardia antigen       Other    Diarrhea     Patient instructed to obtain a fecal sample for a stool for O&P, H  Pylori infection, Giardia infection, a possible C  Diff PCR  Patient understands the reasoning behind obtaining a fecal sample  Educated that the sample takes about 1 week for culture results to return  Patient agrees to treatment plan above  Relevant Orders    H  pylori antigen, stool    Ova and parasite examination    Stool Enteric Bacterial Panel by PCR    Clostridium difficile toxin by PCR with EIA    Giardia antigen                 {Assess/PlanSmartLinks:58192}          Subjective:      Patient ID: Robby Bell is a 28 y o  female  CC:    Chief Complaint   Patient presents with    Heartburn     Started two weeks ago, pt states this is a daily issue  Has been using OTC Prilosec 20mg daily after lunch  Pt states she started taking this medication about 5 days ago  Does admit to it having somewhat of a positive effect      Diarrhea     Pt states this happens every other day,     Blood Sugar Problem     Pt states if she has a lot of sugar intake she has " sugar crashes" where she is feeling dizzy and unable to stand   Pt states she has a fam hx of DM        HPI:    HPI    {Common ambulatory SmartLinks:73714}      Review of Systems Data to review:       Objective:    Vitals:    09/14/22 0807   BP: 118/70   BP Location: Left arm   Patient Position: Sitting   Cuff Size: Adult   Pulse: 78   Temp: (!) 97 3 °F (36 3 °C)   TempSrc: Probe   Weight: 90 3 kg (199 lb)   Height: 5' 3" (1 6 m)        Physical Exam

## 2022-09-21 LAB
ADV 40+41 DNA STL QL NAA+NON-PROBE: NOT DETECTED
ASTRO TYP 1-8 RNA STL QL NAA+NON-PROBE: NOT DETECTED
C CAYETANENSIS DNA STL QL NAA+NON-PROBE: NOT DETECTED
C COLI+JEJ+UPSA DNA STL QL NAA+NON-PROBE: NOT DETECTED
C DIF TOX TCDA+TCDB STL QL NAA+NON-PROBE: NOT DETECTED
C DIFF TOX A+B STL QL IA: NEGATIVE
CRYPTOSP DNA STL QL NAA+NON-PROBE: NOT DETECTED
E COLI O157 DNA STL QL NAA+NON-PROBE: NORMAL
E HISTOLYT DNA STL QL NAA+NON-PROBE: NOT DETECTED
EAEC PAA PLAS AGGR+AATA ST NAA+NON-PRB: NOT DETECTED
EC STX1+STX2 GENES STL QL NAA+NON-PROBE: NOT DETECTED
EPEC EAE GENE STL QL NAA+NON-PROBE: NOT DETECTED
ETEC LTA+ST1A+ST1B TOX ST NAA+NON-PROBE: NOT DETECTED
G LAMBLIA AG STL QL IA: NEGATIVE
G LAMBLIA DNA STL QL NAA+NON-PROBE: NOT DETECTED
H PYLORI AG STL QL IA: NEGATIVE
Lab: NORMAL
NOROVIRUS GI+II RNA STL QL NAA+NON-PROBE: NOT DETECTED
O+P STL CONC: NORMAL
P SHIGELLOIDES DNA STL QL NAA+NON-PROBE: NOT DETECTED
RVA RNA STL QL NAA+NON-PROBE: NOT DETECTED
S ENT+BONG DNA STL QL NAA+NON-PROBE: NOT DETECTED
SAPO I+II+IV+V RNA STL QL NAA+NON-PROBE: NOT DETECTED
SHIGELLA SP+EIEC IPAH ST NAA+NON-PROBE: NOT DETECTED
V CHOL+PARA+VUL DNA STL QL NAA+NON-PROBE: NOT DETECTED
V CHOLERAE DNA STL QL NAA+NON-PROBE: NOT DETECTED
Y ENTEROCOL DNA STL QL NAA+NON-PROBE: NOT DETECTED

## 2022-09-22 NOTE — RESULT ENCOUNTER NOTE
Please let pt know her stool studies were all negative/normal  Continue with our treatment plan and f/u in 2 weeks

## 2022-11-02 ENCOUNTER — OFFICE VISIT (OUTPATIENT)
Dept: FAMILY MEDICINE CLINIC | Facility: CLINIC | Age: 32
End: 2022-11-02

## 2022-11-02 VITALS
BODY MASS INDEX: 34.91 KG/M2 | SYSTOLIC BLOOD PRESSURE: 114 MMHG | HEIGHT: 63 IN | WEIGHT: 197 LBS | DIASTOLIC BLOOD PRESSURE: 72 MMHG | HEART RATE: 72 BPM | TEMPERATURE: 99 F

## 2022-11-02 DIAGNOSIS — J06.9 ACUTE URI: Primary | ICD-10-CM

## 2022-11-02 NOTE — PROGRESS NOTES
Name: Reggie Kumar      : 1990      MRN: [id number]  Encounter Provider: Hermelinda Dotson PA-C  Encounter Date: 2022   Encounter department: [facility] PRIMARY CARE    Assessment & Plan     1  Acute URI  Assessment & Plan:  Pt  is slowly resolving for am illness that started one month ago and pt is already s/p amoxil for  She continues with sore throat however I do not see need for any additional antibiotic at this time and am recommending OTC Mucinex D as directed generic  Subjective      Pt here today as she started with a cold one month ago and was given and finished amoxil  She is feeling much better overall but is still bothered by the fact she has a daily ST  No longer congested  Minimal mucus and when she does see it it is slightly colored  No fever, cough, partner who was sick at same time completely resolved  She is not taking any OTC meds and never had seasonal allergies in the past      Review of Systems   Constitutional: Negative  HENT: Positive for sore throat  Eyes: Negative  Respiratory: Negative  Cardiovascular: Negative  Gastrointestinal: Negative  Endocrine: Negative  Genitourinary: Negative  Musculoskeletal: Negative  Skin: Negative  Allergic/Immunologic: Negative  Neurological: Negative  Hematological: Negative  Psychiatric/Behavioral: Negative  Current Outpatient Medications on File Prior to Visit   Medication Sig   • latanoprost (XALATAN) 0 005 % ophthalmic solution Administer to both eyes   • Rimegepant Sulfate (Nurtec) 75 MG TBDP Take 75 mg by mouth       Objective     /72 (BP Location: Left arm, Patient Position: Sitting, Cuff Size: Adult)   Pulse 72   Temp 99 °F (37 2 °C) (Tympanic)   Ht 5' 3" (1 6 m) Comment: on record  Wt 89 4 kg (197 lb)   BMI 34 90 kg/m²     Physical Exam  Vitals and nursing note reviewed  Constitutional:       General: She is not in acute distress       Appearance: She is well-developed  She is not diaphoretic  HENT:      Head: Normocephalic and atraumatic  Right Ear: Hearing, tympanic membrane, ear canal and external ear normal       Left Ear: Hearing, tympanic membrane, ear canal and external ear normal       Nose: Nose normal       Mouth/Throat:      Comments: Mild post nasal irritation posterior pharynx but no exudate or edema  Eyes:      General:         Right eye: No discharge  Left eye: No discharge  Conjunctiva/sclera: Conjunctivae normal    Neck:      Vascular: No carotid bruit  Cardiovascular:      Rate and Rhythm: Normal rate and regular rhythm  Heart sounds: Normal heart sounds  No murmur heard  No friction rub  No gallop  Pulmonary:      Effort: Pulmonary effort is normal  No respiratory distress  Breath sounds: Normal breath sounds  No wheezing or rales  Musculoskeletal:      Cervical back: Neck supple  Skin:     General: Skin is warm and dry  Neurological:      Mental Status: She is alert and oriented to person, place, and time     Psychiatric:         Judgment: Judgment normal        Herlinda Pulido PA-C

## 2022-11-02 NOTE — ASSESSMENT & PLAN NOTE
Pt  is slowly resolving for am illness that started one month ago and pt is already s/p amoxil for  She continues with sore throat however I do not see need for any additional antibiotic at this time and am recommending OTC Mucinex D as directed generic

## 2022-11-09 ENCOUNTER — PROCEDURE VISIT (OUTPATIENT)
Dept: OBGYN CLINIC | Facility: CLINIC | Age: 32
End: 2022-11-09

## 2022-11-09 VITALS
SYSTOLIC BLOOD PRESSURE: 122 MMHG | HEIGHT: 63 IN | BODY MASS INDEX: 35.33 KG/M2 | DIASTOLIC BLOOD PRESSURE: 80 MMHG | WEIGHT: 199.4 LBS

## 2022-11-09 DIAGNOSIS — R87.610 ASCUS WITH POSITIVE HIGH RISK HPV CERVICAL: Primary | ICD-10-CM

## 2022-11-09 DIAGNOSIS — R87.810 ASCUS WITH POSITIVE HIGH RISK HPV CERVICAL: Primary | ICD-10-CM

## 2022-11-09 NOTE — PROGRESS NOTES
Colposcopy     Date/Time 11/9/2022 2:11 PM     Universal Protocol   Consent: Verbal consent obtained  Written consent obtained  Risks and benefits: risks, benefits and alternatives were discussed  Consent given by: patient  Time out: Immediately prior to procedure a "time out" was called to verify the correct patient, procedure, equipment, support staff and site/side marked as required  Patient understanding: patient states understanding of the procedure being performed  Patient consent: the patient's understanding of the procedure matches consent given  Procedure consent: procedure consent matches procedure scheduled  Relevant documents: relevant documents present and verified  Test results: test results available and properly labeled  Required items: required blood products, implants, devices, and special equipment available  Patient identity confirmed: verbally with patient       Performed by  Nancy Mejia MD     Authorized by Nancy Mejia MD        Pre-procedure details     Pre-procedure timeout performed: yes      Prepped with: acetic acid       Indication    ASC-US     Procedure Details   Procedure: Colposcopy w/ cervical biopsy and ECC      [place] speculum was placed in the vagina: yes      Under colposcopic examination the transition zone was seen in entirety: yes      Endocervix was curetted using a Kevorkian curette: yes      Cervical biopsy performed with a cervical biopsy punch: yes      Monsel's solution was applied: yes      Biopsy(s): yes      Location:  2, 6    Specimen to pathology: yes       Post-procedure      Findings: White epithelium      Impression: Low grade cervical dysplasia      Patient tolerance of procedure: Tolerated well, no immediate complications     Comments       Post procedure instructions reviewed

## 2022-11-16 LAB
PATH REPORT.SITE OF ORIGIN SPEC: NORMAL
PAYMENT PROCEDURE: NORMAL
SL AMB .: NORMAL

## 2022-11-17 ENCOUNTER — TELEPHONE (OUTPATIENT)
Dept: PAIN MEDICINE | Facility: CLINIC | Age: 32
End: 2022-11-17

## 2022-11-17 NOTE — TELEPHONE ENCOUNTER
S/w pt who is rqst options for inj's/ pain mngt  Pt states her pain sx are "static" as LOV  Pt states PT was providing min/mod relief of pain sx  Pt states pain is constant in her b/l LB radiating to RLE  Pt states pain is aching, "electrical" and throbbing in nature  Pt states pain is worse when she is sitting or when she is having a "flare up" w/ too much movement, but generally movement helps her pain sx  Pt current pain level is 5/10  Pt has not seen NP nor has f/u appt  LOV 10/13/21 Consult AFRICA Hoang 12/7/21  Last inj @ [facility] 12/13/19    Pls advise

## 2022-11-17 NOTE — TELEPHONE ENCOUNTER
Caller: Raymundo Patel    Doctor/Office: Saw Braga asked to speak to: nurse    Call was transferred to: Nurse

## 2022-11-17 NOTE — TELEPHONE ENCOUNTER
Attempted contact w/ pt  Erika Meza w/ c/b #, [place] and loc      LOV: 10/13/21 KW  NS Eval: 12/7/21 DO who advised pt to do conserv options prior to surgery (I e inj)  Last inj: 4/27/18, 12/13/19 @ [facility]

## 2022-11-17 NOTE — TELEPHONE ENCOUNTER
Caller: patient     Doctor: Consuelo Ritchie    Reason for call: Discuss injection options    Call back#: [phone]

## 2022-12-19 ENCOUNTER — OFFICE VISIT (OUTPATIENT)
Dept: PAIN MEDICINE | Facility: CLINIC | Age: 32
End: 2022-12-19

## 2022-12-19 VITALS
DIASTOLIC BLOOD PRESSURE: 82 MMHG | BODY MASS INDEX: 35.97 KG/M2 | SYSTOLIC BLOOD PRESSURE: 134 MMHG | RESPIRATION RATE: 16 BRPM | HEART RATE: 70 BPM | HEIGHT: 63 IN | WEIGHT: 203 LBS

## 2022-12-19 DIAGNOSIS — M51.16 INTERVERTEBRAL DISC DISORDER WITH RADICULOPATHY OF LUMBAR REGION: ICD-10-CM

## 2022-12-19 DIAGNOSIS — M51.16 LUMBAR DISC HERNIATION WITH RADICULOPATHY: ICD-10-CM

## 2022-12-19 DIAGNOSIS — G89.4 CHRONIC PAIN SYNDROME: Primary | ICD-10-CM

## 2022-12-19 NOTE — PROGRESS NOTES
Assessment:  1  Chronic pain syndrome    2  Intervertebral disc disorder with radiculopathy of lumbar region    3  Lumbar disc herniation with radiculopathy        Plan:  The patient is a 26-year-old female with a history of chronic pain secondary to low back pain, lumbar disc disease with radiculopathy and lumbar disc herniation who presents to the office with mildly improved but ongoing bilateral low back pain and pain that radiates into the right lower extremity stopping at the right knee  At this time, to decrease inflammation and provide her relief, I instructed we could perform a bilateral L4-5 TFESI  Patient would like to proceed  I instructed our  will schedule her  Complete risks and benefits including bleeding, infection, tissue reaction, nerve injury and allergic reaction were discussed  The approach was demonstrated using models and literature was provided  Verbal and written consent was obtained  Instructed patient to add Tylenol or ibuprofen to her medication regimen  My impressions and treatment recommendations were discussed in detail with the patient who verbalized understanding and had no further questions  Discharge instructions were provided  I personally saw and examined the patient and I agree with the above discussed plan of care  Orders Placed This Encounter   Procedures   • [place] spine and pain procedure     Dr Star Melendrez     Standing Status:   Future     Standing Expiration Date:   12/19/2026     Order Specific Question:   Reason for Exam:     Answer:   Bilateral L4-L5 TFESI     Order Specific Question:   Is the patient pregnant? Answer:   No     Order Specific Question:   Anticoagulant hold needed? Answer:   No     No orders of the defined types were placed in this encounter        History of Present Illness:  Addie Holter is a 28 y o  female with a history of chronic pain secondary to low back pain, lumbar disc disease with radiculopathy and lumbar disc herniation  She was last seen on 10/13/2021 where an MRI of her lumbar spine was ordered as well as she was sent to a formal physical therapy program   MRI of lumbar spine done on 10/29/2021 shows a large disc herniation protrusion type with severe central canal narrowing at L4-5 and a left paracentral disc herniation at L5-S1 resulting in mild to moderate central canal narrowing  She did go to physical therapy and states she felt physical therapy helped her back pain  She presents to the office with mildly improved but ongoing bilateral low back pain and pain that radiates into the right lower extremity stopping at the right knee  She states her pain is better since the last office visit and intermittent but worse in the morning  She rates the quality of her pain as dull/aching, throbbing, shooting and pins/needles it is currently rating it a 5/10 on a numeric scale  She is not currently taking anything for pain  I have personally reviewed and/or updated the patient's past medical history, past surgical history, family history, social history, current medications, allergies, and vital signs today  Review of Systems   Respiratory: Negative for shortness of breath  Cardiovascular: Negative for chest pain  Gastrointestinal: Negative for constipation, diarrhea, nausea and vomiting  Musculoskeletal: Positive for back pain  Negative for arthralgias, gait problem, joint swelling and myalgias  RLE Pain   Skin: Negative for rash  Neurological: Negative for dizziness, seizures and weakness  All other systems reviewed and are negative        Patient Active Problem List   Diagnosis   • Migraine headache   • DDD (degenerative disc disease), lumbosacral   • Lumbar disc herniation with radiculopathy   • Irritable bowel syndrome with both constipation and diarrhea   • Anxiety, generalized   • Pelvic pain in female   • Sciatica of left side associated with disorder of lumbar spine   • History of tobacco use   • SOB (shortness of breath)   • Depression, recurrent (HCC)   • Diarrhea   • Gastroesophageal reflux disease without esophagitis   • Acute URI       Past Medical History:   Diagnosis Date   • Abnormal Pap smear of cervix 2022SCUS with pos HPV-; [id number] neg, Biopsies negative   • Bilateral ocular hypertension    • Low back pain    • Migraine     w/ aura only when using combined hormonal contraception   • Sciatica     left sided       Past Surgical History:   Procedure Laterality Date   • LUMBAR EPIDURAL INJECTION      2 MAGDALENA       Family History   Problem Relation Age of Onset   • Hypertension Mother    • Arthritis Mother    • Hypothyroidism Mother    • Diabetes Father    • Diabetes Maternal Uncle    • Breast cancer Neg Hx    • Colon cancer Neg Hx    • Ovarian cancer Neg Hx    • Uterine cancer Neg Hx        Social History     Occupational History   • Not on file   Tobacco Use   • Smoking status: Former     Types: Cigarettes     Quit date: 2020     Years since quittin 2   • Smokeless tobacco: Never   Vaping Use   • Vaping Use: Former   Substance and Sexual Activity   • Alcohol use: Yes     Comment: occasionally   • Drug use: Not Currently     Types: Marijuana, LSD, MDMA (ecstacy)     Comment: over 10 years ago   • Sexual activity: Yes     Partners: Male     Birth control/protection: Rhythm     Comment: NFP       Current Outpatient Medications on File Prior to Visit   Medication Sig   • latanoprost (XALATAN) 0 005 % ophthalmic solution Administer to both eyes   • Rimegepant Sulfate (Nurtec) 75 MG TBDP Take 75 mg by mouth     No current facility-administered medications on file prior to visit         Allergies   Allergen Reactions   • Treenut [Nuts - Food Allergy] Swelling     Tree nuts       Physical Exam:    /82   Pulse 70   Resp 16   Ht 5' 3" (1 6 m)   Wt 92 1 kg (203 lb)   BMI 35 96 kg/m²     Constitutional:normal, well developed, well nourished, alert, in no distress and non-toxic and no overt pain behavior    Eyes:anicteric  HEENT:grossly intact  Neck:supple, symmetric, trachea midline and no masses   Pulmonary:even and unlabored  Cardiovascular:No edema or pitting edema present  Skin:Normal without rashes or lesions and well hydrated  Psychiatric:Mood and affect appropriate  Neurologic:Cranial Nerves II-XII grossly intact  Musculoskeletal:normal     Lumbar Spine Exam    Appearance:  Normal lordosis  Palpation/Tenderness:  left lumbar paraspinal tenderness  right lumbar paraspinal tenderness  Sensory:  no sensory deficits noted  Range of Motion:  Flexion:  Minimally limited  with pain  Extension:  Minimally limited  with pain  Motor Strength:  Left hip flexion:  5/5  Right hip flexion:  5/5  Left knee flexion:  5/5  Left knee extension:  5/5  Right knee flexion:  5/5  Right knee extension:  5/5  Left foot dorsiflexion:  5/5  Left foot plantar flexion:  5/5  Right foot dorsiflexion:  5/5  Right foot plantar flexion:  5/5    Imaging

## 2023-01-01 PROBLEM — J06.9 ACUTE URI: Status: RESOLVED | Noted: 2022-11-02 | Resolved: 2023-01-01

## 2023-02-06 ENCOUNTER — HOSPITAL ENCOUNTER (OUTPATIENT)
Dept: RADIOLOGY | Facility: CLINIC | Age: 33
Discharge: HOME | End: 2023-02-06
Admitting: PHYSICAL MEDICINE & REHABILITATION

## 2023-02-06 VITALS
TEMPERATURE: 97.6 F | DIASTOLIC BLOOD PRESSURE: 88 MMHG | RESPIRATION RATE: 18 BRPM | SYSTOLIC BLOOD PRESSURE: 144 MMHG | HEART RATE: 62 BPM | OXYGEN SATURATION: 96 %

## 2023-02-06 DIAGNOSIS — M51.16 INTERVERTEBRAL DISC DISORDER WITH RADICULOPATHY OF LUMBAR REGION: ICD-10-CM

## 2023-02-06 RX ORDER — BUPIVACAINE HCL/PF 2.5 MG/ML
10 VIAL (ML) INJECTION ONCE
Status: COMPLETED | OUTPATIENT
Start: 2023-02-06 | End: 2023-02-06

## 2023-02-06 RX ORDER — PAPAVERINE HCL 150 MG
20 CAPSULE, EXTENDED RELEASE ORAL ONCE
Status: COMPLETED | OUTPATIENT
Start: 2023-02-06 | End: 2023-02-06

## 2023-02-06 RX ORDER — 0.9 % SODIUM CHLORIDE 0.9 %
10 VIAL (ML) INJECTION ONCE
Status: COMPLETED | OUTPATIENT
Start: 2023-02-06 | End: 2023-02-06

## 2023-02-06 RX ADMIN — IOHEXOL 1 ML: 300 INJECTION, SOLUTION INTRAVENOUS at 15:11

## 2023-02-06 RX ADMIN — Medication 3 ML: at 15:09

## 2023-02-06 RX ADMIN — BUPIVACAINE HYDROCHLORIDE 2 ML: 2.5 INJECTION, SOLUTION EPIDURAL; INFILTRATION; INTRACAUDAL at 15:13

## 2023-02-06 RX ADMIN — DEXAMETHASONE SODIUM PHOSPHATE 10 MG: 10 INJECTION, SOLUTION INTRAMUSCULAR; INTRAVENOUS at 15:13

## 2023-02-06 RX ADMIN — SODIUM CHLORIDE 3 ML: 9 INJECTION, SOLUTION INTRAMUSCULAR; INTRAVENOUS; SUBCUTANEOUS at 15:09

## 2023-02-06 NOTE — H&P
History of Present Illness: The patient is a 28 y o  female who presents with complaints of low back pain    Past Medical History:   Diagnosis Date   • Abnormal Pap smear of cervix 07/2022 7/2022ASCUS with pos HPV-; [id number] neg, Biopsies negative   • Bilateral ocular hypertension    • Low back pain    • Migraine     w/ aura only when using combined hormonal contraception   • Sciatica     left sided       Past Surgical History:   Procedure Laterality Date   • LUMBAR EPIDURAL INJECTION      2 MAGDALENA         Current Outpatient Medications:   •  latanoprost (XALATAN) 0 005 % ophthalmic solution, Administer to both eyes, Disp: , Rfl:   •  Rimegepant Sulfate (Nurtec) 75 MG TBDP, Take 75 mg by mouth, Disp: , Rfl:     Current Facility-Administered Medications:   •  bupivacaine (PF) (MARCAINE) 0 25 % injection 10 mL, 10 mL, Epidural, Once, Celsone Alan, DO  •  dexamethasone (PF) (DECADRON) injection 20 mg, 20 mg, Epidural, Once, Tamika Phillips, DO  •  iohexol (OMNIPAQUE) 300 mg/mL injection 50 mL, 50 mL, Epidural, Once, Celsone Dayae, DO  •  lidocaine (PF) (XYLOCAINE-MPF) 2 % injection 5 mL, 5 mL, Infiltration, Once, Celsone Dayae, DO  •  sodium chloride (PF) 0 9 % injection 10 mL, 10 mL, Infiltration, Once, Tamika Stapletone, DO    Allergies   Allergen Reactions   • Treenut [Nuts - Food Allergy] Swelling     Tree nuts       Physical Exam:   Vitals:    02/06/23 1459   BP: 151/90   Pulse: 75   Resp: 18   Temp: 97 6 °F (36 4 °C)   SpO2: 98%     General: Awake, Alert, Oriented x 3, Mood and affect appropriate  Respiratory: Respirations even and unlabored  Cardiovascular: Peripheral pulses intact; no edema  Musculoskeletal Exam: Tenderness to palpation bilateral lumbar paraspinals    ASA Score:2    Patient/Chart Verification  Patient ID Verified: Verbal  ID Band Applied: No  Consents Confirmed: Procedural, To be obtained in the Pre-Procedure area  H&P( within 30 days) Verified:  To be obtained in the Pre-Procedure area  Allergies Reviewed: Yes  Anticoag/NSAID held?: No  Currently on antibiotics?: No  Pregnancy denied?: Yes    Assessment:   1   Intervertebral disc disorder with radiculopathy of lumbar region        Plan: Bilateral L4 TFESI

## 2023-02-06 NOTE — DISCHARGE INSTR - LAB
Epidural Steroid Injection   WHAT YOU NEED TO KNOW:   An epidural steroid injection (MAGDALENA) is a procedure to inject steroid medicine into the epidural space  The epidural space is between your spinal cord and vertebrae  Steroids reduce inflammation and fluid buildup in your spine that may be causing pain  You may be given pain medicine along with the steroids  ACTIVITY  Do not drive or operate machinery today  No strenuous activity today - bending, lifting, etc   You may resume normal activites starting tomorrow - start slowly and as tolerated  You may shower today, but no tub baths or hot tubs  You may have numbness for several hours from the local anesthetic  Please use caution and common sense, especially with weight-bearing activities  CARE OF THE INJECTION SITE  If you have soreness or pain, apply ice to the area today (20 minutes on/20 minutes off)  Starting tomorrow, you may use warm, moist heat or ice if needed  You may have an increase or change in your discomfort for 36-48 hours after your treatment  Apply ice and continue with any pain medication you have been prescribed  Notify [facility] if you have any of the following: redness, drainage, swelling, headache, stiff neck or fever above 100°F     SPECIAL INSTRUCTIONS  Our office will contact you in approximately 7 days for a progress report  MEDICATIONS  Continue to take all routine medications  Our office may have instructed you to hold some medications  As no general anesthesia was used in today's procedure, you should not experience any side effects related to anesthesia  If you are diabetic, the steroids used in today's injection may temporarily increase your blood sugar levels after the first few days after your injection  Please keep a close eye on your sugars and alert the doctor who manages your diabetes if your sugars are significantly high from your baseline or you are symptomatic       If you have a problem specifically related to your procedure, please call our office at [phone]  Problems not related to your procedure should be directed to your primary care physician

## 2023-02-13 ENCOUNTER — TELEPHONE (OUTPATIENT)
Dept: RADIOLOGY | Facility: MEDICAL CENTER | Age: 33
End: 2023-02-13

## 2023-02-13 NOTE — TELEPHONE ENCOUNTER
Patient report:0 % improvement post injection    Pain Level: 6/10    Patient states that the pain is worse now and would like a call back from a nurse  Patient is aware we will call back next week for an update

## 2023-02-13 NOTE — TELEPHONE ENCOUNTER
Pt s/p BL L4 TFESI on 2/6  S/w pt, states she had an increase in pain following injection, primarily on L side  Pt states injection site is tender to touch, but is not visibly swollen or red  Pt states she has been using ice and ibuprofen following injection, RN advised pt to continue doing so as needed  RN advised pt it can take two weeks for full benefit of inj, pt understood  Any further recommendations at this time?

## 2023-02-13 NOTE — TELEPHONE ENCOUNTER
DO aware and agree  No further recommendations at this time needs to give the injection more time  Thank you

## 2023-02-20 ENCOUNTER — OFFICE VISIT (OUTPATIENT)
Dept: FAMILY MEDICINE CLINIC | Facility: CLINIC | Age: 33
End: 2023-02-20

## 2023-02-20 VITALS
HEART RATE: 86 BPM | HEIGHT: 63 IN | DIASTOLIC BLOOD PRESSURE: 66 MMHG | SYSTOLIC BLOOD PRESSURE: 102 MMHG | BODY MASS INDEX: 35.79 KG/M2 | WEIGHT: 202 LBS | TEMPERATURE: 97 F

## 2023-02-20 DIAGNOSIS — K58.2 IRRITABLE BOWEL SYNDROME WITH BOTH CONSTIPATION AND DIARRHEA: ICD-10-CM

## 2023-02-20 DIAGNOSIS — E16.2 HYPOGLYCEMIA: Primary | ICD-10-CM

## 2023-02-20 NOTE — PATIENT INSTRUCTIONS
1  Hypoglycemia  Assessment & Plan:  Per  pt hx it does seem like pt is going through episodes of low sugar every few weeks  Will check labs including 3 hour GTT, insulin level and CMP  Info given about how to eat small frequent meals with protien  Call with results  Pt is allergic to tree nuts so this compounds her easy access to protein  Orders:  -     Insulin, fasting; Future  -     Comprehensive metabolic panel; Future  -     Glucose Tolerance (3 Sp Blood); Future  -     HEMOGLOBIN A1C W/ EAG ESTIMATION; Future  -     Comprehensive metabolic panel  -     Glucose Tolerance (3 Sp Blood)  -     HEMOGLOBIN A1C W/ EAG ESTIMATION    2  Irritable bowel syndrome with both constipation and diarrhea  Assessment & Plan:  Given information on IBS and trying the FODMAP diet  Non-diabetic Hypoglycemia   WHAT YOU NEED TO KNOW:   Non-diabetic hypoglycemia is a condition that causes the sugar (glucose) in your blood to drop too low  This can happen in people who do not have diabetes  The 2 types of non-diabetic hypoglycemia are fasting hypoglycemia and reactive hypoglycemia  Fasting hypoglycemia often happens after a person goes without food for 8 hours or longer  Reactive hypoglycemia usually happens about 2 to 4 hours after a meal  When your blood sugar level is low, your muscles and brain cells do not have enough energy to work well  DISCHARGE INSTRUCTIONS:   Follow up with your healthcare provider as directed:  Write down your questions so you remember to ask them during your visits  Keep carbohydrates with you:  Carbohydrates will raise your blood sugar level when you have symptoms of hypoglycemia  Carbohydrates are found in bread, rice, cereal, fruits, juice, and milk  Prevent hypoglycemia:  You may need to change what and when you eat to prevent low blood sugar levels  Follow the meal plan that you and the dietitian have created   The following guidelines may help you keep your blood sugar levels under control  Eat 5 to 6 small meals each day instead of 3 large meals  Eat the same amount of carbohydrate at meals and snacks each day  Most people need about 3 to 4 servings of carbohydrate at meals and 1 to 2 servings for snacks  Do not skip meals  Carbohydrate counting can be used plan your meals  Ask your healthcare provider or dietitian for information about carbohydrate counting  Limit refined carbohydrates  Examples are white bread, pastries (pies and cakes), regular sodas, syrups, and candy  Do not have drinks or foods that contain caffeine  Examples are coffee, tea, and certain types of sodas  Caffeine may cause you to have the same symptoms as hypoglycemia, and may cause you to feel worse  Limit or do not drink alcohol  Women should limit alcohol to 1 drink a day  Men should limit alcohol to 2 drinks a day  A drink of alcohol is 12 ounces of beer, 5 ounces of wine, or 1½ ounces of liquor  Do not drink alcohol on an empty stomach  Drink alcohol with meals to prevent hypoglycemia  Include protein foods and vegetables in your meals  Some foods that are high in protein include beef, pork, fish, poultry (chicken and turkey), beans, and nuts  Eat a variety of vegetables with your meals  Contact your healthcare provider if:   You have blurred vision or vision changes  You feel very tired and weak  You are sweating more than usual for you  You have a fast heartbeat  You feel dizzy, lightheaded, and shaky  You have questions about your condition or care  Return to the emergency department if:   You have symptoms of hypoglycemia and cannot eat  You have trouble thinking clearly  You have a seizure or faint  © [facility] 2022 Information is for End User's use only and may not be sold, redistributed or otherwise used for commercial purposes  The above information is an  only   It is not intended as medical advice for individual conditions or treatments  Talk to your doctor, nurse or pharmacist before following any medical regimen to see if it is safe and effective for you  Irritable Bowel Syndrome   WHAT YOU NEED TO KNOW:   Irritable bowel syndrome (IBS) is a condition that prevents food from moving through your intestines normally  The food may move through too slowly or too quickly  This causes abdominal pain, bloating, increased gas, constipation, or diarrhea  DISCHARGE INSTRUCTIONS:   Return to the emergency department if:   You have severe abdominal pain  Your bowel movements are dark or have blood in them  Call your doctor if:   You have a fever  You have pain in your rectum  You are losing weight without trying  Your abdominal pain does not go away, even after treatment  You have questions or concerns about your condition or care  Medicines:   Medicines  help you have a bowel movement, soften your bowel movement, or treat diarrhea  You may also need medicine to relax your muscles  This will decrease abdominal pain and muscles spasms  Take your medicine as directed  Contact your healthcare provider if you think your medicine is not helping or if you have side effects  Tell your provider if you are allergic to any medicine  Keep a list of the medicines, vitamins, and herbs you take  Include the amounts, and when and why you take them  Bring the list or the pill bottles to follow-up visits  Carry your medicine list with you in case of an emergency  Manage IBS:   Eat a variety of healthy foods  You may need to avoid certain foods to decrease your symptoms  Ask your provider about diets that might help your symptoms, such as the low FODMAP diet  Drink liquids as directed  Ask how much liquid to drink each day and which liquids are best for you  For most people, good liquids to drink are water, juice, and milk  Exercise regularly  Ask about the best exercise plan for you   Exercise can decrease your blood pressure and improve your health  Keep a record for 3 weeks  Include everything you eat and drink and your symptoms  Bring this record with you to your follow-up visits  Follow up with your doctor as directed:  Write down your questions so you remember to ask them during your visits  © Copyright [employer] 2022 Information is for End User's use only and may not be sold, redistributed or otherwise used for commercial purposes  The above information is an  only  It is not intended as medical advice for individual conditions or treatments  Talk to your doctor, nurse or pharmacist before following any medical regimen to see if it is safe and effective for you

## 2023-02-20 NOTE — ASSESSMENT & PLAN NOTE
Per  pt hx it does seem like pt is going through episodes of low sugar every few weeks  Will check labs including 3 hour GTT, insulin level and CMP  Info given about how to eat small frequent meals with protien  Call with results  Pt is allergic to tree nuts so this compounds her easy access to protein

## 2023-02-20 NOTE — PROGRESS NOTES
Name: Nolvia Becker      : 1990      MRN: [id number]  Encounter Provider: Randa Grewal PA-C  Encounter Date: 2023   Encounter department: [facility]    Assessment & Plan     1  Hypoglycemia  Assessment & Plan:  Per  pt hx it does seem like pt is going through episodes of low sugar every few weeks  Will check labs including 3 hour GTT, insulin level and CMP  Info given about how to eat small frequent meals with protien  Call with results  Pt is allergic to tree nuts so this compounds her easy access to protein  Orders:  -     Insulin, fasting; Future  -     Comprehensive metabolic panel; Future  -     Glucose Tolerance (3 Sp Blood); Future  -     HEMOGLOBIN A1C W/ EAG ESTIMATION; Future  -     Comprehensive metabolic panel  -     Glucose Tolerance (3 Sp Blood)  -     HEMOGLOBIN A1C W/ EAG ESTIMATION    2  Irritable bowel syndrome with both constipation and diarrhea  Assessment & Plan:  Given information on IBS and trying the FODMAP diet  BMI Counseling: Body mass index is 35 78 kg/m²  The BMI is above normal  Nutrition recommendations include decreasing portion sizes, encouraging healthy choices of fruits and vegetables, decreasing fast food intake, consuming healthier snacks and limiting drinks that contain sugar  Exercise recommendations include exercising 3-5 times per week  No pharmacotherapy was ordered  Rationale for BMI follow-up plan is due to patient being overweight or obese  Subjective      Patient states that she has been noticing severe episodes of what she thinks is hypoglycemia roughly 1 maybe 2 times a month for the past 6 months or so  She states that she really feels like she is in a pass out during these episodes she becomes very nauseous shaky sweaty  Her boyfriend urged her to come  She states that there is diabetes in her family    She notices that episodes seem to occur when she eats something simple carbs for breakfast such as a muffin versus eggs  She states that she is allergic to tree nuts and therefore has a hard time finding easy access protein that she can carry around  She states that she does try to eat chickpeas  Also she states that she still having problems with certain foods with diarrhea and wondering if there is any testing that we can do she did recently learned about the FODMAP diet  Review of Systems   Constitutional: Negative  HENT: Negative  Eyes: Negative  Respiratory: Negative  Cardiovascular: Negative  Gastrointestinal: Positive for abdominal pain and diarrhea  Endocrine: Negative  Genitourinary: Negative  Musculoskeletal: Negative  Skin: Negative  Allergic/Immunologic: Negative  Neurological: Positive for tremors, weakness and light-headedness  Hematological: Negative  Psychiatric/Behavioral: Negative  Current Outpatient Medications on File Prior to Visit   Medication Sig   • latanoprost (XALATAN) 0 005 % ophthalmic solution Administer to both eyes   • Rimegepant Sulfate (Nurtec) 75 MG TBDP Take 75 mg by mouth       Objective     /66 (BP Location: Left arm, Patient Position: Sitting, Cuff Size: Standard)   Pulse 86   Temp (!) 97 °F (36 1 °C) (Temporal)   Ht 5' 3" (1 6 m) Comment: on file  Wt 91 6 kg (202 lb)   BMI 35 78 kg/m²     Physical Exam  Vitals and nursing note reviewed  Constitutional:       General: She is not in acute distress  Appearance: She is well-developed  She is not diaphoretic  HENT:      Head: Normocephalic and atraumatic  Eyes:      General:         Right eye: No discharge  Left eye: No discharge  Conjunctiva/sclera: Conjunctivae normal    Neck:      Vascular: No carotid bruit  Cardiovascular:      Rate and Rhythm: Normal rate and regular rhythm  Heart sounds: Normal heart sounds  No murmur heard  No friction rub  No gallop  Pulmonary:      Effort: Pulmonary effort is normal  No respiratory distress  Breath sounds: Normal breath sounds  No wheezing or rales  Musculoskeletal:      Cervical back: Neck supple  Skin:     General: Skin is warm and dry  Neurological:      Mental Status: She is alert and oriented to person, place, and time     Psychiatric:         Judgment: Judgment normal        Celeste Epley, PA-C

## 2023-03-11 LAB
ALBUMIN SERPL-MCNC: 4.4 G/DL (ref 3.8–4.8)
ALBUMIN/GLOB SERPL: 1.6 {RATIO} (ref 1.2–2.2)
ALP SERPL-CCNC: 67 IU/L (ref 44–121)
ALT SERPL-CCNC: 20 IU/L (ref 0–32)
AST SERPL-CCNC: 22 IU/L (ref 0–40)
BILIRUB SERPL-MCNC: 0.4 MG/DL (ref 0–1.2)
BUN SERPL-MCNC: 13 MG/DL (ref 6–20)
BUN/CREAT SERPL: 14 (ref 9–23)
CALCIUM SERPL-MCNC: 8.9 MG/DL (ref 8.7–10.2)
CHLORIDE SERPL-SCNC: 103 MMOL/L (ref 96–106)
CO2 SERPL-SCNC: 22 MMOL/L (ref 20–29)
CREAT SERPL-MCNC: 0.95 MG/DL (ref 0.57–1)
EGFR: 82 ML/MIN/1.73
EST. AVERAGE GLUCOSE BLD GHB EST-MCNC: 111 MG/DL
GLOBULIN SER-MCNC: 2.8 G/DL (ref 1.5–4.5)
GLUCOSE 1.5H P 75 G GLC PO SERPL-MCNC: NORMAL MG/DL
GLUCOSE 1H P 75 G GLC PO SERPL-MCNC: 105 MG/DL (ref 70–199)
GLUCOSE 2H P 75 G GLC PO SERPL-MCNC: 92 MG/DL (ref 70–139)
GLUCOSE 30M P 75 G GLC PO SERPL-MCNC: NORMAL MG/DL
GLUCOSE 3H P 75 G GLC PO SERPL-MCNC: NORMAL MG/DL
GLUCOSE 4H P 75 G GLC PO SERPL-MCNC: NORMAL MG/DL
GLUCOSE 5H P 75 G GLC PO SERPL-MCNC: NORMAL MG/DL
GLUCOSE 6H P 75 G GLC PO SERPL-MCNC: NORMAL MG/DL
GLUCOSE P FAST SERPL-MCNC: 91 MG/DL (ref 70–99)
GLUCOSE SERPL-MCNC: 92 MG/DL (ref 70–99)
HBA1C MFR BLD: 5.5 % (ref 4.8–5.6)
INSULIN SERPL-ACNC: 14.2 UIU/ML (ref 2.6–24.9)
POTASSIUM SERPL-SCNC: 3.9 MMOL/L (ref 3.5–5.2)
PROT SERPL-MCNC: 7.2 G/DL (ref 6–8.5)
SODIUM SERPL-SCNC: 140 MMOL/L (ref 134–144)

## 2023-03-13 ENCOUNTER — TELEPHONE (OUTPATIENT)
Dept: FAMILY MEDICINE CLINIC | Facility: CLINIC | Age: 33
End: 2023-03-13

## 2023-03-13 NOTE — TELEPHONE ENCOUNTER
----- Message from Naina Ibrahim PA-C sent at 3/13/2023  8:57 AM EDT -----  Please let the patient know that she did have a flat response to the glucose tolerance challenge which definitely means that she is prone to hypoglycemia  I encouraged her to implement what we discussed at her last visit which is frequent more protein packed meals and snacks to avoid hypoglycemia  If she implements this and her symptoms persist I can refer her to endocrinology  Thank you

## 2023-03-26 DIAGNOSIS — E16.2 HYPOGLYCEMIA: Primary | ICD-10-CM

## 2023-04-06 ENCOUNTER — CONSULT (OUTPATIENT)
Dept: ENDOCRINOLOGY | Facility: CLINIC | Age: 33
End: 2023-04-06

## 2023-04-06 VITALS
BODY MASS INDEX: 35.01 KG/M2 | HEIGHT: 63 IN | OXYGEN SATURATION: 100 % | DIASTOLIC BLOOD PRESSURE: 58 MMHG | WEIGHT: 197.6 LBS | SYSTOLIC BLOOD PRESSURE: 96 MMHG | HEART RATE: 75 BPM

## 2023-04-06 DIAGNOSIS — R53.83 FATIGUE, UNSPECIFIED TYPE: Primary | ICD-10-CM

## 2023-04-06 DIAGNOSIS — E16.2 HYPOGLYCEMIA: ICD-10-CM

## 2023-04-06 NOTE — PROGRESS NOTES
[facility] 28 y o  female MRN: [id number]    Encounter: [id number]      Assessment/Plan     Assessment: This is a 28y o -year-old female with PMH of IBS, GERD, depression, anxiety, migraines, sciatica who presents to establish care for concerns for blood glucose    Plan:  Concerns for blood glucose  We will evaluate for adrenal insufficiency with a m  cortisol and ACTH given her history of periodic intra-articular steroid use and symptoms of fatigue, dizziness, palpitations, diaphoresis  We will evaluate thyroid function with TSH and free t4  Discussed with the patient that she should measure her blood glucose when she is symptomatic  Discussed that blood glucose of up to 55 can be normal for patients who  do not have diabetes  Discussed about possibility of using CGM to measure blood glucose  CC: Concern for blood glucose    History of Present Illness     HPI:  This is a 28y o -year-old female with PMH of IBS, GERD, depression, anxiety, migraines, sciatica who presents to establish care for concerns for blood glucose    She reports to have episodes of fatigue, dizzy, palpitations, diaphoresis, reports to have sx of resolve after eating food  After eating simple carbs like cereal, pasta she notices that she gets these symptomatic episodes  These episodes occur about 2-3x/month, has been ongoing since last 2 years  She is measuring blood glucose since last few weeks and has noted no such episodes since she got glucometer    Fasting BG is in 90s-100s, right after meal is 130s, 1hr post prandial is 80, goes to low 70s about 2 hrs or more post prandially  She eats 5 meals a day, 3 big meals and 2 snacks  She had oral glucose tolerance test done in the past with lowest blood glucose of 91 and insulin of 14 2    She also gets steroid injections every 18 months since 2018  No hx of adrenal or pituitary related disorders  No hx of head trauma or concussions in the past  No chronic oral steroid use   No skin hyperpigmentation, salt cravings, muscle weakness  No hx of gastric bypass surgery in the past  She has family hx of type 2 diabetes mellitus in father (dx in his 62s), brother (dx at age 35s), paternal uncle  Mother has hx of hypothyroidism  Review of Systems   Constitutional: Positive for diaphoresis and fatigue  Negative for activity change and appetite change  HENT: Negative for congestion and sore throat  Eyes: Negative for redness and visual disturbance  Respiratory: Negative for cough and shortness of breath  Cardiovascular: Negative for palpitations and leg swelling  Gastrointestinal: Negative for abdominal pain, constipation, diarrhea, nausea and vomiting  Endocrine: Negative for cold intolerance, heat intolerance, polydipsia, polyphagia and polyuria  Genitourinary: Negative for difficulty urinating and dysuria  Musculoskeletal: Negative for gait problem and neck pain  Skin: Negative for color change  Neurological: Positive for light-headedness  Negative for dizziness and syncope  Psychiatric/Behavioral: Negative for agitation and behavioral problems  All other systems reviewed and are negative        Historical Information   Past Medical History:   Diagnosis Date   • Abnormal Pap smear of cervix 07/2022 7/2022ASCUS with pos HPV-; [id number] neg, Biopsies negative   • Bilateral ocular hypertension    • Low back pain    • Migraine     w/ aura only when using combined hormonal contraception   • Sciatica     left sided     Past Surgical History:   Procedure Laterality Date   • LUMBAR EPIDURAL INJECTION      2 MAGDALENA     Social History   Social History     Substance and Sexual Activity   Alcohol Use Yes   • Alcohol/week: 4 0 standard drinks   • Types: 4 Standard drinks or equivalent per week    Comment: occasionally     Social History     Substance and Sexual Activity   Drug Use Never   • Types: Marijuana, LSD, MDMA (ecstacy)    Comment: over 10 years ago     Social History "    Tobacco Use   Smoking Status Former   • Packs/day: 0 50   • Years: 10 00   • Pack years: 5 00   • Types: Cigarettes   • Start date: 2008   • Quit date: 2020   • Years since quittin 5   Smokeless Tobacco Never     Family History:   Family History   Problem Relation Age of Onset   • Hypertension Mother    • Arthritis Mother    • Hypothyroidism Mother    • Diabetes Father    • Diabetes type II Father    • Diabetes Maternal Uncle    • Diabetes type II Paternal Uncle    • Diabetes type II Brother    • Breast cancer Neg Hx    • Colon cancer Neg Hx    • Ovarian cancer Neg Hx    • Uterine cancer Neg Hx        Meds/Allergies   Current Outpatient Medications   Medication Sig Dispense Refill   • latanoprost (XALATAN) 0 005 % ophthalmic solution Administer to both eyes     • Rimegepant Sulfate (Nurtec) 75 MG TBDP Take 75 mg by mouth       No current facility-administered medications for this visit  Allergies   Allergen Reactions   • Treenut [Nuts - Food Allergy] Swelling     Tree nuts       Objective   Vitals: Blood pressure 96/58, pulse 75, height 5' 3\" (1 6 m), weight 89 6 kg (197 lb 9 6 oz), SpO2 100 %  Physical Exam  Constitutional:       Appearance: Normal appearance  HENT:      Head: Normocephalic and atraumatic  Cardiovascular:      Rate and Rhythm: Normal rate and regular rhythm  Pulses: Normal pulses  Heart sounds: Normal heart sounds  No murmur heard  No gallop  Pulmonary:      Effort: Pulmonary effort is normal       Breath sounds: Normal breath sounds  No wheezing or rales  Abdominal:      General: Bowel sounds are normal       Palpations: Abdomen is soft  Tenderness: There is no abdominal tenderness  Musculoskeletal:         General: No swelling  Cervical back: Normal range of motion and neck supple  No tenderness  Right lower leg: No edema  Left lower leg: No edema  Lymphadenopathy:      Cervical: No cervical adenopathy     Skin:     General: Skin " "is warm  Comments: No palmar or buccal hyperpigmentation   Neurological:      Mental Status: She is alert and oriented to person, place, and time  Mental status is at baseline  Psychiatric:         Mood and Affect: Mood normal          Behavior: Behavior normal          The history was obtained from the review of the chart, patient  Lab Results:   Lab Results   Component Value Date/Time    Hemoglobin A1C 5 5 03/10/2023 07:21 AM    BUN 13 03/10/2023 07:21 AM    Potassium 3 9 03/10/2023 07:21 AM    Chloride 103 03/10/2023 07:21 AM    CO2 22 03/10/2023 07:21 AM    Creatinine 0 95 03/10/2023 07:21 AM    AST 22 03/10/2023 07:21 AM    ALT 20 03/10/2023 07:21 AM    Albumin 4 4 03/10/2023 07:21 AM    Globulin, Total 2 8 03/10/2023 07:21 AM           Imaging Studies: I have personally reviewed pertinent reports  Portions of the record may have been created with voice recognition software  Occasional wrong word or \"sound a like\" substitutions may have occurred due to the inherent limitations of voice recognition software  Read the chart carefully and recognize, using context, where substitutions have occurred    "

## 2023-04-26 ENCOUNTER — TELEPHONE (OUTPATIENT)
Dept: DERMATOLOGY | Facility: CLINIC | Age: 33
End: 2023-04-26

## 2023-08-11 ENCOUNTER — OFFICE VISIT (OUTPATIENT)
Dept: FAMILY MEDICINE CLINIC | Facility: CLINIC | Age: 33
End: 2023-08-11
Payer: COMMERCIAL

## 2023-08-11 VITALS
DIASTOLIC BLOOD PRESSURE: 80 MMHG | BODY MASS INDEX: 33.03 KG/M2 | WEIGHT: 186.4 LBS | HEART RATE: 68 BPM | OXYGEN SATURATION: 100 % | TEMPERATURE: 98.3 F | HEIGHT: 63 IN | SYSTOLIC BLOOD PRESSURE: 120 MMHG

## 2023-08-11 DIAGNOSIS — G43.909 MIGRAINE WITHOUT STATUS MIGRAINOSUS, NOT INTRACTABLE, UNSPECIFIED MIGRAINE TYPE: ICD-10-CM

## 2023-08-11 DIAGNOSIS — F33.9 DEPRESSION, RECURRENT (HCC): Primary | ICD-10-CM

## 2023-08-11 DIAGNOSIS — F41.1 ANXIETY, GENERALIZED: ICD-10-CM

## 2023-08-11 PROCEDURE — 99214 OFFICE O/P EST MOD 30 MIN: CPT | Performed by: PHYSICIAN ASSISTANT

## 2023-08-11 RX ORDER — RIMEGEPANT SULFATE 75 MG/75MG
75 TABLET, ORALLY DISINTEGRATING ORAL AS NEEDED
Qty: 16 TABLET | Refills: 1 | Status: SHIPPED | OUTPATIENT
Start: 2023-08-11

## 2023-08-11 RX ORDER — ESCITALOPRAM OXALATE 5 MG/1
5 TABLET ORAL DAILY
Qty: 30 TABLET | Refills: 5 | Status: SHIPPED | OUTPATIENT
Start: 2023-08-11

## 2023-08-11 NOTE — ASSESSMENT & PLAN NOTE
Controlled despite therapy on a regular basis for many months now. At this time patient is ready to start medications so will initiate Lexapro 5 mg once daily and have her return to the office in 6 weeks. No SI or HI. I did place a psych consult as patient has requested but she is aware that it may take up to 6 months or more for an appointment and therefore we will initiate treatment before hand. Patient can call prior to her next follow-up appointment if needed.

## 2023-08-11 NOTE — PROGRESS NOTES
Name: Rollin Meckel      : 1990      MRN: [id number]  Encounter Provider: Nahum Martinez PA-C  Encounter Date: 2023   Encounter department: [facility] PRIMARY CARE    Assessment & Plan     1. Depression, recurrent ([address])  -     Ambulatory Referral to Psychiatry; Future  -     escitalopram (LEXAPRO) 5 mg tablet; Take 1 tablet (5 mg total) by mouth daily    2. Anxiety, generalized  Assessment & Plan:  Controlled despite therapy on a regular basis for many months now. At this time patient is ready to start medications so will initiate Lexapro 5 mg once daily and have her return to the office in 6 weeks. No SI or HI. I did place a psych consult as patient has requested but she is aware that it may take up to 6 months or more for an appointment and therefore we will initiate treatment before hand. Patient can call prior to her next follow-up appointment if needed. Orders:  -     Ambulatory Referral to Psychiatry; Future  -     escitalopram (LEXAPRO) 5 mg tablet; Take 1 tablet (5 mg total) by mouth daily    3. Migraine without status migrainosus, not intractable, unspecified migraine type  Assessment & Plan:  Pt needs a refill of her Nurtec from her specialist who has retired. May take over prescribing. Uses only 1 a week and is hormone dependant. Orders:  -     rimegepant sulfate (Nurtec) 75 mg TBDP; Take 1 tablet (75 mg total) by mouth if needed (migrane)           Subjective      Virtual therapy for 4-7 months. Still with anxiety.  urged her to make an appointment today. She called psychiatry and unfortunately they told her they can put her on the list but have no idea if and when they will call her. Patient is at this point ready to start medication and she has not been to this point in the past.  She just gets very overwhelmed and anxious in certain situations becomes tearful and full of anxiety. Patient states that she usually sleeps well. No notable depression. Patient does deal with anxiety on a day-to-day basis of some level. Review of Systems   Constitutional: Negative. HENT: Negative. Eyes: Negative. Respiratory: Negative. Cardiovascular: Negative. Gastrointestinal: Negative. Endocrine: Negative. Genitourinary: Negative. Musculoskeletal: Negative. Skin: Negative. Allergic/Immunologic: Negative. Neurological: Negative. Hematological: Negative. Psychiatric/Behavioral: The patient is nervous/anxious. Current Outpatient Medications on File Prior to Visit   Medication Sig   • [DISCONTINUED] Rimegepant Sulfate (Nurtec) 75 MG TBDP Take 75 mg by mouth   • latanoprost (XALATAN) 0.005 % ophthalmic solution Administer to both eyes (Patient not taking: Reported on 8/11/2023)       Objective     /80 (BP Location: Left arm, Patient Position: Sitting, Cuff Size: Adult)   Pulse 68   Temp 98.3 °F (36.8 °C) (Tympanic)   Ht 5' 3" (1.6 m)   Wt 84.6 kg (186 lb 6.4 oz)   SpO2 100%   BMI 33.02 kg/m²     Physical Exam  Vitals and nursing note reviewed. Constitutional:       General: She is not in acute distress. Appearance: She is well-developed. She is not diaphoretic. HENT:      Head: Normocephalic and atraumatic. Eyes:      General:         Right eye: No discharge. Left eye: No discharge. Conjunctiva/sclera: Conjunctivae normal.   Neck:      Vascular: No carotid bruit. Cardiovascular:      Rate and Rhythm: Normal rate and regular rhythm. Heart sounds: Normal heart sounds. No murmur heard. No friction rub. No gallop. Pulmonary:      Effort: Pulmonary effort is normal. No respiratory distress. Breath sounds: Normal breath sounds. No wheezing or rales. Musculoskeletal:      Cervical back: Neck supple. Skin:     General: Skin is warm and dry. Neurological:      Mental Status: She is alert and oriented to person, place, and time.    Psychiatric:         Judgment: Judgment normal. Codie Murdock PA-C

## 2023-08-11 NOTE — PATIENT INSTRUCTIONS
Problem List Items Addressed This Visit          Cardiovascular and Mediastinum    Migraine headache     Pt needs a refill of her Nurtec from her specialist who has retired. May take over prescribing. Uses only 1 a week and is hormone dependant. Relevant Medications    escitalopram (LEXAPRO) 5 mg tablet    rimegepant sulfate (Nurtec) 75 mg TBDP       Other    Anxiety, generalized     Controlled despite therapy on a regular basis for many months now. At this time patient is ready to start medications so will initiate Lexapro 5 mg once daily and have her return to the office in 6 weeks. No SI or HI. I did place a psych consult as patient has requested but she is aware that it may take up to 6 months or more for an appointment and therefore we will initiate treatment before hand. Patient can call prior to her next follow-up appointment if needed.          Relevant Medications    escitalopram (LEXAPRO) 5 mg tablet    Other Relevant Orders    Ambulatory Referral to Psychiatry    Depression, recurrent ([address]) - Primary    Relevant Medications    escitalopram (LEXAPRO) 5 mg tablet    Other Relevant Orders    Ambulatory Referral to Psychiatry

## 2023-08-11 NOTE — ASSESSMENT & PLAN NOTE
Pt needs a refill of her Nurtec from her specialist who has retired. May take over prescribing. Uses only 1 a week and is hormone dependant.

## 2023-08-28 ENCOUNTER — TELEPHONE (OUTPATIENT)
Dept: PSYCHIATRY | Facility: CLINIC | Age: 33
End: 2023-08-28

## 2023-08-28 NOTE — TELEPHONE ENCOUNTER
Contacted pt in regards to routine referral and to get placed on proper wait list.     [facility] or [facility] loc pref; Female provider pref.

## 2023-09-02 DIAGNOSIS — F33.9 DEPRESSION, RECURRENT (HCC): ICD-10-CM

## 2023-09-02 DIAGNOSIS — F41.1 ANXIETY, GENERALIZED: ICD-10-CM

## 2023-09-06 RX ORDER — ESCITALOPRAM OXALATE 5 MG/1
5 TABLET ORAL DAILY
Qty: 90 TABLET | Refills: 2 | Status: SHIPPED | OUTPATIENT
Start: 2023-09-06

## 2023-09-19 DIAGNOSIS — G43.909 MIGRAINE WITHOUT STATUS MIGRAINOSUS, NOT INTRACTABLE, UNSPECIFIED MIGRAINE TYPE: ICD-10-CM

## 2023-09-19 RX ORDER — RIMEGEPANT SULFATE 75 MG/75MG
75 TABLET, ORALLY DISINTEGRATING ORAL AS NEEDED
Qty: 16 TABLET | Refills: 0 | Status: SHIPPED | OUTPATIENT
Start: 2023-09-19

## 2023-09-25 ENCOUNTER — OFFICE VISIT (OUTPATIENT)
Dept: FAMILY MEDICINE CLINIC | Facility: CLINIC | Age: 33
End: 2023-09-25
Payer: COMMERCIAL

## 2023-09-25 VITALS
WEIGHT: 189.2 LBS | DIASTOLIC BLOOD PRESSURE: 70 MMHG | TEMPERATURE: 97.2 F | BODY MASS INDEX: 33.52 KG/M2 | HEIGHT: 63 IN | SYSTOLIC BLOOD PRESSURE: 104 MMHG | HEART RATE: 62 BPM

## 2023-09-25 DIAGNOSIS — F41.1 ANXIETY, GENERALIZED: Primary | ICD-10-CM

## 2023-09-25 DIAGNOSIS — F33.9 DEPRESSION, RECURRENT (HCC): ICD-10-CM

## 2023-09-25 PROCEDURE — 99213 OFFICE O/P EST LOW 20 MIN: CPT | Performed by: PHYSICIAN ASSISTANT

## 2023-09-25 RX ORDER — ESCITALOPRAM OXALATE 10 MG/1
10 TABLET ORAL DAILY
Qty: 30 TABLET | Refills: 5 | Status: SHIPPED | OUTPATIENT
Start: 2023-09-25

## 2023-09-25 NOTE — PATIENT INSTRUCTIONS
Problem List Items Addressed This Visit          Other    Anxiety, generalized - Primary     Improved on initiation of lexapro 5 mg but not at goal so will increase to 10 mg and check in 2 months. NO SI or HI. Relevant Medications    escitalopram (LEXAPRO) 10 mg tablet    Depression, recurrent ([facility])     Improved on initiation of lexapro 5 mg but not at goal so will increase to 10 mg and check in 2 months. NO SI or HI.           Relevant Medications    escitalopram (LEXAPRO) 10 mg tablet

## 2023-09-25 NOTE — ASSESSMENT & PLAN NOTE
Improved on initiation of lexapro 5 mg but not at goal so will increase to 10 mg and check in 2 months. NO SI or HI.

## 2023-09-25 NOTE — PROGRESS NOTES
Name: Rafael Saucedo      : 1990      MRN: [id number]  Encounter Provider: Marcello Mullen PA-C  Encounter Date: 2023   Encounter department: [facility] PRIMARY CARE    Assessment & Plan     1. Anxiety, generalized  Assessment & Plan:  Improved on initiation of lexapro 5 mg but not at goal so will increase to 10 mg and check in 2 months. NO SI or HI. Orders:  -     escitalopram (LEXAPRO) 10 mg tablet; Take 1 tablet (10 mg total) by mouth daily    2. Depression, recurrent ([address])  Assessment & Plan:  Improved on initiation of lexapro 5 mg but not at goal so will increase to 10 mg and check in 2 months. NO SI or HI. Orders:  -     escitalopram (LEXAPRO) 10 mg tablet; Take 1 tablet (10 mg total) by mouth daily           Subjective      Patient here today for a recheck after initiating Lexapro 5 mg once daily. Patient states the first 10 days to 2 weeks she had a lot of grogginess trouble concentrating some nausea and then things eventually leveled off. She also had insomnia to the point where she was waking up every hour and decided to take it not before bed but in the middle of the day. Her  and her agree that she definitely has less outbursts of anxiety. Unfortunately did not make any changes to her amount of migraines that she is having as she really thinks that these are ovulation menstrual cycle related. Is seeing the benefits and would like to try increasing her medication at this time. No SI or HI. Review of Systems   Constitutional: Negative. HENT: Negative. Eyes: Negative. Respiratory: Negative. Cardiovascular: Negative. Gastrointestinal: Negative. Endocrine: Negative. Genitourinary: Negative. Musculoskeletal: Negative. Skin: Negative. Allergic/Immunologic: Negative. Neurological: Negative. Hematological: Negative. Psychiatric/Behavioral: Positive for dysphoric mood and sleep disturbance. The patient is nervous/anxious. Current Outpatient Medications on File Prior to Visit   Medication Sig   • rimegepant sulfate (Nurtec) 75 mg TBDP Take 1 tablet (75 mg total) by mouth if needed (migrane)   • [DISCONTINUED] escitalopram (LEXAPRO) 5 mg tablet TAKE 1 TABLET (5 MG TOTAL) BY MOUTH DAILY. • [DISCONTINUED] latanoprost (XALATAN) 0.005 % ophthalmic solution Administer to both eyes       Objective     /70 (BP Location: Right arm, Patient Position: Sitting, Cuff Size: Standard)   Pulse 62   Temp (!) 97.2 °F (36.2 °C) (Temporal)   Ht 5' 3" (1.6 m) Comment: on file  Wt 85.8 kg (189 lb 3.2 oz)   BMI 33.52 kg/m²     Physical Exam  Vitals and nursing note reviewed. Constitutional:       General: She is not in acute distress. Appearance: She is well-developed. She is not diaphoretic. HENT:      Head: Normocephalic and atraumatic. Eyes:      General:         Right eye: No discharge. Left eye: No discharge. Conjunctiva/sclera: Conjunctivae normal.   Neck:      Vascular: No carotid bruit. Cardiovascular:      Rate and Rhythm: Normal rate and regular rhythm. Heart sounds: Normal heart sounds. No murmur heard. No friction rub. No gallop. Pulmonary:      Effort: Pulmonary effort is normal. No respiratory distress. Breath sounds: Normal breath sounds. No wheezing or rales. Musculoskeletal:      Cervical back: Neck supple. Skin:     General: Skin is warm and dry. Neurological:      Mental Status: She is alert and oriented to person, place, and time.    Psychiatric:         Judgment: Judgment normal.       Alice Fernandez PA-C

## 2023-10-23 DIAGNOSIS — F41.1 ANXIETY, GENERALIZED: ICD-10-CM

## 2023-10-23 DIAGNOSIS — F33.9 DEPRESSION, RECURRENT (HCC): ICD-10-CM

## 2023-10-23 RX ORDER — ESCITALOPRAM OXALATE 10 MG/1
10 TABLET ORAL DAILY
Qty: 90 TABLET | Refills: 2 | Status: SHIPPED | OUTPATIENT
Start: 2023-10-23

## 2023-11-29 ENCOUNTER — OFFICE VISIT (OUTPATIENT)
Dept: FAMILY MEDICINE CLINIC | Facility: CLINIC | Age: 33
End: 2023-11-29
Payer: COMMERCIAL

## 2023-11-29 VITALS
HEIGHT: 63 IN | BODY MASS INDEX: 34.38 KG/M2 | WEIGHT: 194 LBS | SYSTOLIC BLOOD PRESSURE: 102 MMHG | HEART RATE: 66 BPM | DIASTOLIC BLOOD PRESSURE: 68 MMHG | TEMPERATURE: 96.9 F

## 2023-11-29 DIAGNOSIS — G43.909 MIGRAINE WITHOUT STATUS MIGRAINOSUS, NOT INTRACTABLE, UNSPECIFIED MIGRAINE TYPE: ICD-10-CM

## 2023-11-29 DIAGNOSIS — F33.9 DEPRESSION, RECURRENT (HCC): Primary | ICD-10-CM

## 2023-11-29 DIAGNOSIS — F41.1 ANXIETY, GENERALIZED: ICD-10-CM

## 2023-11-29 PROCEDURE — 99214 OFFICE O/P EST MOD 30 MIN: CPT | Performed by: PHYSICIAN ASSISTANT

## 2023-11-29 RX ORDER — BUPROPION HYDROCHLORIDE 150 MG/1
150 TABLET ORAL EVERY MORNING
Qty: 30 TABLET | Refills: 5 | Status: SHIPPED | OUTPATIENT
Start: 2023-11-29 | End: 2024-05-27

## 2023-11-29 RX ORDER — ESCITALOPRAM OXALATE 5 MG/1
5 TABLET ORAL DAILY
Qty: 30 TABLET | Refills: 5 | Status: SHIPPED | OUTPATIENT
Start: 2023-11-29

## 2023-11-29 RX ORDER — RIMEGEPANT SULFATE 75 MG/75MG
75 TABLET, ORALLY DISINTEGRATING ORAL AS NEEDED
Qty: 16 TABLET | Refills: 1 | Status: SHIPPED | OUTPATIENT
Start: 2023-11-29

## 2023-11-29 NOTE — PROGRESS NOTES
Name: Javon Hadley      : 1990      MRN: [id number]  Encounter Provider: Audrey Jordan PA-C  Encounter Date: 2023   Encounter department: [facility] PRIMARY CARE    Assessment & Plan     1. Depression, recurrent ([address])  Assessment & Plan:  No SI or HI patient did back down to Lexapro 5 mg as 10 mg was intolerant due to side effects. At this time however will augment with Wellbutrin 150 once daily and keep her on the Lexapro 5 to hopefully control her symptoms. Orders:  -     buPROPion (WELLBUTRIN XL) 150 mg 24 hr tablet; Take 1 tablet (150 mg total) by mouth every morning  -     escitalopram (LEXAPRO) 5 mg tablet; Take 1 tablet (5 mg total) by mouth daily    2. Migraine without status migrainosus, not intractable, unspecified migraine type  -     rimegepant sulfate (Nurtec) 75 mg TBDP; Take 1 tablet (75 mg total) by mouth if needed (migrane)    3. Anxiety, generalized  Assessment & Plan:  No SI or HI patient did back down to Lexapro 5 mg as 10 mg was intolerant due to side effects. At this time however will augment with Wellbutrin 150 once daily and keep her on the Lexapro 5 to hopefully control her symptoms. Recheck 6 weeks. Orders:  -     buPROPion (WELLBUTRIN XL) 150 mg 24 hr tablet; Take 1 tablet (150 mg total) by mouth every morning  -     escitalopram (LEXAPRO) 5 mg tablet; Take 1 tablet (5 mg total) by mouth daily           Subjective      Patient presents with: Follow-up: 2 month f/u would like to discuss lexparo sttes she has started feeling fatigue after increase of medication after the first week of taking new dose she's started noticing she has had hard time focus, fatigue and spacing out while driving. Pt states she has started cutting down her medication in half for 5mg a couple weeks ago.      Patient states that the 10 mg of Lexapro helped amazingly and took away all of her anxiety but she could not tolerate it because she was sleeping 10 hours and still wanting more and had decreased motivation. She did speak with friends and family and they mentioned adding Wellbutrin as that helped them. This is definitely an option. Review of Systems   Constitutional: Negative. HENT: Negative. Eyes: Negative. Respiratory: Negative. Cardiovascular: Negative. Gastrointestinal: Negative. Endocrine: Negative. Genitourinary: Negative. Musculoskeletal: Negative. Skin: Negative. Allergic/Immunologic: Negative. Neurological: Negative. Hematological: Negative. Psychiatric/Behavioral:  Positive for dysphoric mood. The patient is nervous/anxious. Current Outpatient Medications on File Prior to Visit   Medication Sig   • [DISCONTINUED] escitalopram (LEXAPRO) 10 mg tablet TAKE 1 TABLET BY MOUTH EVERY DAY   • [DISCONTINUED] rimegepant sulfate (Nurtec) 75 mg TBDP Take 1 tablet (75 mg total) by mouth if needed (migrane)       Objective     /68 (BP Location: Right arm, Patient Position: Sitting, Cuff Size: Standard)   Pulse 66   Temp (!) 96.9 °F (36.1 °C) (Temporal)   Ht 5' 3" (1.6 m)   Wt 88 kg (194 lb)   BMI 34.37 kg/m²     Physical Exam  Vitals and nursing note reviewed. Constitutional:       General: She is not in acute distress. Appearance: She is well-developed. She is not diaphoretic. HENT:      Head: Normocephalic and atraumatic. Nose: Nose normal.   Eyes:      General:         Right eye: No discharge. Left eye: No discharge. Conjunctiva/sclera: Conjunctivae normal.   Neck:      Vascular: No carotid bruit. Cardiovascular:      Rate and Rhythm: Normal rate and regular rhythm. Heart sounds: Normal heart sounds. No murmur heard. No friction rub. No gallop. Pulmonary:      Effort: Pulmonary effort is normal. No respiratory distress. Breath sounds: Normal breath sounds. No wheezing or rales. Musculoskeletal:      Cervical back: Neck supple. Skin:     General: Skin is warm and dry. Neurological:      Mental Status: She is alert and oriented to person, place, and time.    Psychiatric:         Judgment: Judgment normal.       Almaz Escobar PA-C

## 2023-11-29 NOTE — ASSESSMENT & PLAN NOTE
No SI or HI patient did back down to Lexapro 5 mg as 10 mg was intolerant due to side effects. At this time however will augment with Wellbutrin 150 once daily and keep her on the Lexapro 5 to hopefully control her symptoms. PHYSICAL THERAPY - DAILY TREATMENT NOTE     Patient Name: Yaya Zimmer        Date: 2021  : 1955   YES Patient  Verified  Visit #:  5   of     Insurance: Payor: Olman Cerna / Plan: [place] / Product Type: Medicare /      In time: 8:28 Out time: 9:25   Total Treatment Time: 57     Medicare/BCBS Time Tracking (below)   Total Timed Codes (min):  47 1:1 Treatment Time:  47     TREATMENT AREA =  Low back pain [M54.5]    SUBJECTIVE    Pain Level (on 0 to 10 scale):  0  / 10   Medication Changes/New allergies or changes in medical history, any new surgeries or procedures? NO    If yes, update Summary List   Subjective Functional Status/Changes:  []  No changes reported   Feeling great. Hardly any pain the last few days.  Residual intermittent R LB pain           OBJECTIVE  Modalities Rationale:     decrease pain and increase tissue extensibility to improve patient's ability to perform ADLs      min [] Estim, type/location:                                      []  att     []  unatt     []  w/US     []  w/ice    []  w/heat    min []  Mechanical Traction: type/lbs                   []  pro   []  sup   []  int   []  cont    []  before manual    []  after manual    min []  Ultrasound, settings/location:      min []  Iontophoresis w/ dexamethasone, location:                                               []  take home patch       []  in clinic   10 min []  Ice     [x]  Heat    location/position:     min []  Vasopneumatic Device, press/temp:              cold / med If using vaso       pre-treatment girth :       post-treatment girth :       measured at (location) :     min []  Other:    [x] Skin assessment post-treatment (if applicable):    [x]  intact    []  redness- no adverse reaction     []redness - adverse reaction:      15 min Manual Therapy: Technique:      [x] S/DTM []IASTM []PROM [] Passive Stretching   [x]manual TPR  [] SOR [] man traction  []Jt manipulation:Gr I [] II []  III [] IV[]   [x] OP with REIL    []   Treatment Area:  LB/MFR to right  QL      *manual therapy interventions were performed at a separate and distinct time from   the therapeutic activities interventions. Rationale:      decrease pain, increase ROM, increase tissue extensibility and decrease trigger points to improve patient's ability to perform ADLs    32 min Therapeutic Exercise:  [x]  See flow sheet   Rationale:      increase ROM and dec neural compromise  to improve the patients ability to perform ADLs         Billed With/As:   [x] TE   [] TA   [] Neuro   [] Self Care Patient Education: [x] Review HEP    [] Progressed/Changed HEP based on:   [x] positioning   [x] body mechanics   [] transfers   [] heat/ice application    [] other:        Other Objective/Functional Measures:  Review body mechanics in reaching and bending, supported and unsupported sitting  Advanced core stabilization toe taps, add post hip stretch with pull     Post Treatment Pain Level (on 0 to 10) scale:   0  / 10     ASSESSMENT  Assessment/Changes in Function:      Functional improvement:decreasing pain in functional ADLs, improving lumbar AROM extension  Functional limitation:  mild R LBP           Patient will continue to benefit from skilled PT services to modify and progress therapeutic interventions, address functional mobility deficits, address ROM deficits, address strength deficits, analyze and address soft tissue restrictions, analyze and cue movement patterns and analyze and modify body mechanics/ergonomics to attain remaining goals.    Progress toward goals / Updated goals:    Good Progress to    [] STG    [x] LTG as shown by pt self report, advanced core stabilization progressions          []  See Progress Note/Recertification    PLAN    [x]  Upgrade activities as tolerated {YES) Continue plan of care   []  Discharge due to :    []  Other:      Therapist: Delta Mcqueen PTA    Date: 9/14/2021 Time: 9:25  AM     Future Appointments Date Time Provider Alessandra Redd   9/16/2021  8:00 AM Citlaly Muck, PT [facility]   9/21/2021  8:00 AM Guthrie Muck, PT [facility]   9/24/2021  8:15 AM Vernice Koyanagi, PTA [facility]   9/27/2021  8:00 AM Citlaly Muck, PT [facility]   9/30/2021  8:00 AM Guthrie Muck, PT [facility]   10/12/2021  4:00 PM Ricard Mcardle, MD [facility]   8/23/2022  8:40 [facility]

## 2023-11-29 NOTE — ASSESSMENT & PLAN NOTE
No SI or HI patient did back down to Lexapro 5 mg as 10 mg was intolerant due to side effects. At this time however will augment with Wellbutrin 150 once daily and keep her on the Lexapro 5 to hopefully control her symptoms. Recheck 6 weeks.

## 2023-11-29 NOTE — PATIENT INSTRUCTIONS
Problem List Items Addressed This Visit          Cardiovascular and Mediastinum    Migraine headache    Relevant Medications    rimegepant sulfate (Nurtec) 75 mg TBDP    buPROPion (WELLBUTRIN XL) 150 mg 24 hr tablet    escitalopram (LEXAPRO) 5 mg tablet       Other    Anxiety, generalized     No SI or HI patient did back down to Lexapro 5 mg as 10 mg was intolerant due to side effects. At this time however will augment with Wellbutrin 150 once daily and keep her on the Lexapro 5 to hopefully control her symptoms. Recheck 6 weeks. Relevant Medications    buPROPion (WELLBUTRIN XL) 150 mg 24 hr tablet    escitalopram (LEXAPRO) 5 mg tablet    Depression, recurrent (HCC) - Primary     No SI or HI patient did back down to Lexapro 5 mg as 10 mg was intolerant due to side effects. At this time however will augment with Wellbutrin 150 once daily and keep her on the Lexapro 5 to hopefully control her symptoms.          Relevant Medications    buPROPion (WELLBUTRIN XL) 150 mg 24 hr tablet    escitalopram (LEXAPRO) 5 mg tablet

## 2023-12-21 DIAGNOSIS — F33.9 DEPRESSION, RECURRENT (HCC): ICD-10-CM

## 2023-12-21 DIAGNOSIS — G43.909 MIGRAINE WITHOUT STATUS MIGRAINOSUS, NOT INTRACTABLE, UNSPECIFIED MIGRAINE TYPE: ICD-10-CM

## 2023-12-21 DIAGNOSIS — F41.1 ANXIETY, GENERALIZED: ICD-10-CM

## 2023-12-22 RX ORDER — ESCITALOPRAM OXALATE 5 MG/1
5 TABLET ORAL DAILY
Qty: 90 TABLET | Refills: 2 | Status: SHIPPED | OUTPATIENT
Start: 2023-12-22

## 2023-12-22 RX ORDER — BUPROPION HYDROCHLORIDE 150 MG/1
150 TABLET ORAL EVERY MORNING
Qty: 90 TABLET | Refills: 2 | Status: SHIPPED | OUTPATIENT
Start: 2023-12-22

## 2023-12-22 RX ORDER — RIMEGEPANT SULFATE 75 MG/75MG
75 TABLET, ORALLY DISINTEGRATING ORAL AS NEEDED
Qty: 48 TABLET | Refills: 1 | Status: SHIPPED | OUTPATIENT
Start: 2023-12-22

## 2024-01-17 ENCOUNTER — OFFICE VISIT (OUTPATIENT)
Dept: FAMILY MEDICINE CLINIC | Facility: CLINIC | Age: 34
End: 2024-01-17
Payer: COMMERCIAL

## 2024-01-17 VITALS
SYSTOLIC BLOOD PRESSURE: 126 MMHG | DIASTOLIC BLOOD PRESSURE: 78 MMHG | BODY MASS INDEX: 34.76 KG/M2 | WEIGHT: 196.2 LBS | HEIGHT: 63 IN | HEART RATE: 88 BPM

## 2024-01-17 DIAGNOSIS — R06.83 SNORING: ICD-10-CM

## 2024-01-17 DIAGNOSIS — F41.1 ANXIETY, GENERALIZED: Primary | ICD-10-CM

## 2024-01-17 DIAGNOSIS — F33.9 DEPRESSION, RECURRENT (HCC): ICD-10-CM

## 2024-01-17 PROCEDURE — 99213 OFFICE O/P EST LOW 20 MIN: CPT | Performed by: PHYSICIAN ASSISTANT

## 2024-01-17 RX ORDER — ESCITALOPRAM OXALATE 5 MG/1
5 TABLET ORAL DAILY
Qty: 90 TABLET | Refills: 2 | Status: SHIPPED | OUTPATIENT
Start: 2024-01-17

## 2024-01-17 NOTE — ASSESSMENT & PLAN NOTE
Pt has only started snoring since her weight gain in general. Long discussion on how weight loss is the only true treatment for her kind of snoring. Discussed ways to lose weight. Pt declining sleep study.

## 2024-01-17 NOTE — PATIENT INSTRUCTIONS
Problem List Items Addressed This Visit          Other    Anxiety, generalized - Primary     Much improved with the addition of wellbutrin 150 to lexapro 5. NO SI or HI. Recheck in 6 months. Continue therapy.          Relevant Medications    escitalopram (LEXAPRO) 5 mg tablet    Depression, recurrent (HCC)     Much improved with the addition of wellbutrin 150 to lexapro 5. NO SI or HI. Recheck in 6 months. Continue therapy.          Relevant Medications    escitalopram (LEXAPRO) 5 mg tablet    Snoring     Pt has only started snoring since her weight gain in general. Long discussion on how weight loss is the only true treatment for her kind of snoring. Discussed ways to lose weight. Pt declining sleep study.

## 2024-01-17 NOTE — ASSESSMENT & PLAN NOTE
Much improved with the addition of wellbutrin 150 to lexapro 5. NO SI or HI. Recheck in 6 months. Continue therapy.

## 2024-01-17 NOTE — PROGRESS NOTES
Snoring  Name: Renae Mancia      : 1990      MRN: [id number]  Encounter Provider: Radha Butts PA-C  Encounter Date: 2024   Encounter department: [facility]    Assessment & Plan     1. Anxiety, generalized  Assessment & Plan:  Much improved with the addition of wellbutrin 150 to lexapro 5. NO SI or HI. Recheck in 6 months. Continue therapy.     Orders:  -     escitalopram (LEXAPRO) 5 mg tablet; Take 1 tablet (5 mg total) by mouth daily    2. Depression, recurrent (HCC)  Assessment & Plan:  Much improved with the addition of wellbutrin 150 to lexapro 5. NO SI or HI. Recheck in 6 months. Continue therapy.     Orders:  -     escitalopram (LEXAPRO) 5 mg tablet; Take 1 tablet (5 mg total) by mouth daily    3. Snoring  Assessment & Plan:  Pt has only started snoring since her weight gain in general. Long discussion on how weight loss is the only true treatment for her kind of snoring. Discussed ways to lose weight. Pt declining sleep study.              Subjective      Patient presents with:  Depression: 6 week f/u on medication. Medication are working well fr pt, would like refills.     At the last visit we added Wellbutrin to patient's Lexapro 5. Pt states that it is working wonderfully in general. No known SE. NO longer depressed with increase in energy and motivation. Anxiety still comes in very stressful situations but improved overall as well. Communication has improved. NO SI or HI. Holidays were nice.       Review of Systems   Constitutional: Negative.    HENT: Negative.     Eyes: Negative.    Respiratory: Negative.     Cardiovascular: Negative.    Gastrointestinal: Negative.    Endocrine: Negative.    Genitourinary: Negative.    Musculoskeletal: Negative.    Skin: Negative.    Allergic/Immunologic: Negative.    Neurological: Negative.    Hematological: Negative.    Psychiatric/Behavioral: Negative.         Current Outpatient Medications on File Prior to Visit   Medication  "Sig   • buPROPion (WELLBUTRIN XL) 150 mg 24 hr tablet TAKE 1 TABLET BY MOUTH EVERY DAY IN THE MORNING   • Nurtec 75 MG TBDP TAKE 1 TABLET (75 MG TOTAL) BY MOUTH IF NEEDED (MIGRANE)   • [DISCONTINUED] escitalopram (LEXAPRO) 5 mg tablet TAKE 1 TABLET (5 MG TOTAL) BY MOUTH DAILY.       Objective     /78 (BP Location: Left arm, Patient Position: Sitting, Cuff Size: Standard)   Pulse 88   Ht 5' 3\" (1.6 m)   Wt 89 kg (196 lb 3.2 oz)   BMI 34.76 kg/m²     Physical Exam  Vitals and nursing note reviewed.   Constitutional:       General: She is not in acute distress.     Appearance: She is well-developed. She is not diaphoretic.   HENT:      Head: Normocephalic and atraumatic.      Nose: Nose normal.   Eyes:      General:         Right eye: No discharge.         Left eye: No discharge.      Conjunctiva/sclera: Conjunctivae normal.   Neck:      Vascular: No carotid bruit.   Cardiovascular:      Rate and Rhythm: Normal rate and regular rhythm.      Heart sounds: Normal heart sounds. No murmur heard.     No friction rub. No gallop.   Pulmonary:      Effort: Pulmonary effort is normal. No respiratory distress.      Breath sounds: Normal breath sounds. No wheezing or rales.   Musculoskeletal:      Cervical back: Neck supple.   Skin:     General: Skin is warm and dry.   Neurological:      Mental Status: She is alert and oriented to person, place, and time.   Psychiatric:         Judgment: Judgment normal.       Radha Butts PA-C    "

## 2024-01-22 DIAGNOSIS — F41.1 ANXIETY, GENERALIZED: ICD-10-CM

## 2024-01-22 DIAGNOSIS — G43.909 MIGRAINE WITHOUT STATUS MIGRAINOSUS, NOT INTRACTABLE, UNSPECIFIED MIGRAINE TYPE: ICD-10-CM

## 2024-01-22 DIAGNOSIS — F33.9 DEPRESSION, RECURRENT (HCC): ICD-10-CM

## 2024-01-22 RX ORDER — BUPROPION HYDROCHLORIDE 150 MG/1
150 TABLET ORAL EVERY MORNING
Qty: 90 TABLET | Refills: 1 | Status: SHIPPED | OUTPATIENT
Start: 2024-01-22

## 2024-01-23 RX ORDER — RIMEGEPANT SULFATE 75 MG/75MG
75 TABLET, ORALLY DISINTEGRATING ORAL AS NEEDED
Qty: 48 TABLET | Refills: 0 | Status: SHIPPED | OUTPATIENT
Start: 2024-01-23

## 2024-01-23 NOTE — TELEPHONE ENCOUNTER
Off-Protocol [id number]/22/2024 03:49 PM   Protocol Details Medication not assigned to a protocol, review manually.    Valid encounter within last 12 months   Last OV 1/17/24

## 2024-01-25 ENCOUNTER — TELEPHONE (OUTPATIENT)
Age: 34
End: 2024-01-25

## 2024-01-25 NOTE — TELEPHONE ENCOUNTER
Patient called and stated the pharmacy needs validation from the providers office in order to fill patients medication of Nurtec. Please contact patients pharmacy [facility].

## 2024-01-26 ENCOUNTER — TELEPHONE (OUTPATIENT)
Age: 34
End: 2024-01-26

## 2024-01-26 NOTE — TELEPHONE ENCOUNTER
Patient called the RX Refill Line. Message is being forwarded to the office.     Patient is requesting -     [facility] called requesting to speak with clinical staff at the office regarding (Nurtec)  follow up question.They also stated called needs to be received before January 28, 2024 11:54 am. Please review and reach out thank you.    Please contact patient at - [phone]                                              Ref # [id number]

## 2024-01-26 NOTE — TELEPHONE ENCOUNTER
Apparently you this was a prior authorization call.  I gave them as much information as I know but I have no background on this medication as we did not start it for her and she was seeing neurology in the past who initiated it and we will just refilling it so most likely will not get covered and we will need more information for to be obtained from the patient that would need to be submitted but will wait for further correspondence.

## 2024-01-26 NOTE — TELEPHONE ENCOUNTER
Again apparently this was more of a [facility] prior authorization call which I did not have the answers to all of their questions because I do not know the patient's history with this medication and what medication she has tried and failed in the past as we did not initiate this medication and was just refilling it from neurology.  We will have further correspondence coming from her prior authorization that we will have to complete but patient may need to ask her neurologist for refill instead as they have all the information as to what she has tried and failed in the past.

## 2024-01-29 ENCOUNTER — TELEPHONE (OUTPATIENT)
Age: 34
End: 2024-01-29

## 2024-01-29 NOTE — TELEPHONE ENCOUNTER
PA for rimegepant sulfate (Nurtec) 75 mg Denied    Reason:          Message sent to provider pool Yes    Denial letter scanned into Media Yes    Appeal started No

## 2024-01-29 NOTE — TELEPHONE ENCOUNTER
Call pt informed provider summit everything and was denied. Next step follow up with neurology understand and will follow.

## 2024-01-29 NOTE — TELEPHONE ENCOUNTER
PA for rimegepant sulfate (Nurtec) 75 mg     Submitted via  []CMM-KEY   [x][employer]-Case ID # [id number]   []Faxed to plan   []Other website   []Phone call Case ID #     Office notes sent, clinical questions answered. Awaiting determination

## 2024-01-30 NOTE — TELEPHONE ENCOUNTER
Patient called stating that her Neurologist is no longer practicing; warm transfer to clinical; patient was advised that we will send message to Radha to see if she would recommend another Neurologist or have patient come in for an appointment. Patient will wait for call from office.

## 2024-02-06 ENCOUNTER — OFFICE VISIT (OUTPATIENT)
Dept: FAMILY MEDICINE CLINIC | Facility: CLINIC | Age: 34
End: 2024-02-06
Payer: COMMERCIAL

## 2024-02-06 VITALS
HEIGHT: 63 IN | OXYGEN SATURATION: 99 % | BODY MASS INDEX: 34.73 KG/M2 | WEIGHT: 196 LBS | SYSTOLIC BLOOD PRESSURE: 118 MMHG | HEART RATE: 68 BPM | DIASTOLIC BLOOD PRESSURE: 68 MMHG

## 2024-02-06 DIAGNOSIS — G43.909 MIGRAINE WITHOUT STATUS MIGRAINOSUS, NOT INTRACTABLE, UNSPECIFIED MIGRAINE TYPE: Primary | ICD-10-CM

## 2024-02-06 PROCEDURE — 99214 OFFICE O/P EST MOD 30 MIN: CPT | Performed by: PHYSICIAN ASSISTANT

## 2024-02-06 RX ORDER — RIZATRIPTAN BENZOATE 10 MG/1
10 TABLET ORAL ONCE AS NEEDED
Qty: 10 TABLET | Refills: 5 | Status: SHIPPED | OUTPATIENT
Start: 2024-02-06

## 2024-02-06 RX ORDER — CANDESARTAN 16 MG/1
16 TABLET ORAL DAILY
Qty: 30 TABLET | Refills: 2 | Status: SHIPPED | OUTPATIENT
Start: 2024-02-06

## 2024-02-06 NOTE — PATIENT INSTRUCTIONS
1. Migraine without status migrainosus, not intractable, unspecified migraine type  Assessment & Plan:  Pt was evaluated by [employer] 3 years ago and was approved to use Nurtec which did work great and now her new insurance company does not want to cover prior auth. Pt is out of her Nurtec.  At this time in order to meet prior Auth we need to start her on a daily preventative and they recommend Atacand 16 mg daily.  Because she is out of her Nurtec we will also give her Maxalt which she states worked in the past for her but gave her rebound headaches.  She has neurology in the works.  Recheck 1 month.    Orders:  -     candesartan (ATACAND) 16 mg tablet; Take 1 tablet (16 mg total) by mouth daily  -     rizatriptan (MAXALT) 10 mg tablet; Take 1 tablet (10 mg total) by mouth once as needed for migraine for up to 1 dose may repeat in 2 hours if necessary

## 2024-02-06 NOTE — ASSESSMENT & PLAN NOTE
Pt was evaluated by [employer] 3 years ago and was approved to use Nurtec which did work great and now her new insurance company does not want to cover prior auth. Pt is out of her Nurtec.  At this time in order to meet prior Auth we need to start her on a daily preventative and they recommend Atacand 16 mg daily.  Because she is out of her Nurtec we will also give her Maxalt which she states worked in the past for her but gave her rebound headaches.  She has neurology in the works.  Recheck 1 month.

## 2024-02-06 NOTE — PROGRESS NOTES
Answers submitted by the patient for this visit:  Neurological Problem Questionnaire (Submitted on 2024)  Chief Complaint: Neurologic complaint  altered mental status: No  clumsiness: No  focal sensory loss: No  focal weakness: No  loss of balance: No  memory loss: No  near-syncope: No  slurred speech: No  syncope: No  visual change: No  weakness: No  Chronicity: chronic  Onset: more than 1 year ago  Onset quality: gradually  Progression since onset: waxing and waning  Focality: no focality noted  abdominal pain: No  auditory change: No  aura: Yes  back pain: No  bladder incontinence: No  bowel incontinence: No  chest pain: No  confusion: No  diaphoresis: No  dizziness: No  fatigue: No  fever: No  headaches: Yes  light-headedness: No  nausea: No  neck pain: No  palpitations: No  shortness of breath: No  vertigo: No  vomiting: No  Treatments tried: acetaminophen, aspirin, bed rest, drinking, eating, medication, neck support, position change, sleep, sugar/glucose, walking  Improvement on treatment: significant  Name: Renae Mancia      : 1990      MRN: [id number]  Encounter Provider: Radha Butts PA-C  Encounter Date: 2024   Encounter department: [facility]    Assessment & Plan     1. Migraine without status migrainosus, not intractable, unspecified migraine type  Assessment & Plan:  Pt was evaluated by [employer] 3 years ago and was approved to use Nurtec which did work great and now her new insurance company does not want to cover prior auth. Pt is out of her Nurtec.  At this time in order to meet prior Auth we need to start her on a daily preventative and they recommend Atacand 16 mg daily.  Because she is out of her Nurtec we will also give her Maxalt which she states worked in the past for her but gave her rebound headaches.  She has neurology in the works.  Recheck 1 month.    Orders:  -     candesartan (ATACAND) 16 mg tablet; Take 1 tablet (16 mg total) by mouth daily  -      rizatriptan (MAXALT) 10 mg tablet; Take 1 tablet (10 mg total) by mouth once as needed for migraine for up to 1 dose may repeat in 2 hours if necessary           Subjective      Patient presents with:  Migraine: Patient present today to discuss migraine treatment    Patient came to us on Nurtec which worked very well for her.  She had been seeing [facility] 3 years ago when she tried to triptans including Imitrex and Maxalt.  Imitrex did not work and the Maxalt was given her rebound headaches so she was switched to Nurtec which has been covered up until now with her new insurance.  We did try to do a prior Auth.  At this point in time patient gets about 6-8 migraines a month.  She never tried any thing else besides mentioned.  Never was tried on any preventative medication such as Topamax Depakote calcium channel blocker or beta-blockers.  Patient did get intake with neurology and is awaiting this but would like to go ahead and move forward with trials of which she would need to get a prior Auth completed.            Neurologic Problem  The patient's pertinent negatives include no altered mental status, clumsiness, focal sensory loss, focal weakness, loss of balance, memory loss, near-syncope, slurred speech, syncope, visual change or weakness. This is a chronic problem. The current episode started more than 1 year ago. The neurological problem developed gradually. The problem has been waxing and waning since onset. There was no focality noted. Associated symptoms include an aura and headaches. Pertinent negatives include no abdominal pain, auditory change, back pain, bladder incontinence, bowel incontinence, chest pain, confusion, diaphoresis, dizziness, fatigue, fever, light-headedness, nausea, neck pain, palpitations, shortness of breath, vertigo or vomiting. Past treatments include acetaminophen, aspirin, bed rest, drinking, eating, medication, neck support, position change, sleep, sugar/glucose and  "walking. The treatment provided significant relief.     Review of Systems   Constitutional: Negative.  Negative for diaphoresis, fatigue and fever.   HENT: Negative.     Eyes: Negative.    Respiratory: Negative.  Negative for shortness of breath.    Cardiovascular: Negative.  Negative for chest pain, palpitations and near-syncope.   Gastrointestinal: Negative.  Negative for abdominal pain, bowel incontinence, nausea and vomiting.   Endocrine: Negative.    Genitourinary: Negative.  Negative for bladder incontinence.   Musculoskeletal: Negative.  Negative for back pain and neck pain.   Skin: Negative.    Allergic/Immunologic: Negative.    Neurological:  Positive for headaches. Negative for dizziness, vertigo, focal weakness, syncope, weakness, light-headedness and loss of balance.   Hematological: Negative.    Psychiatric/Behavioral: Negative.  Negative for confusion and memory loss.        Current Outpatient Medications on File Prior to Visit   Medication Sig   • buPROPion (WELLBUTRIN XL) 150 mg 24 hr tablet Take 1 tablet (150 mg total) by mouth every morning   • escitalopram (LEXAPRO) 5 mg tablet Take 1 tablet (5 mg total) by mouth daily   • rimegepant sulfate (Nurtec) 75 mg TBDP Take 1 tablet (75 mg total) by mouth if needed (migrane)       Objective     /68 (BP Location: Right arm, Patient Position: Sitting, Cuff Size: Large)   Pulse 68   Ht 5' 3\" (1.6 m)   Wt 88.9 kg (196 lb)   SpO2 99%   BMI 34.72 kg/m²     Physical Exam  Vitals and nursing note reviewed.   Constitutional:       General: She is not in acute distress.     Appearance: She is well-developed. She is not diaphoretic.   HENT:      Head: Normocephalic and atraumatic.      Nose: Nose normal.   Eyes:      General:         Right eye: No discharge.         Left eye: No discharge.      Conjunctiva/sclera: Conjunctivae normal.   Neck:      Vascular: No carotid bruit.   Cardiovascular:      Rate and Rhythm: Normal rate and regular rhythm.      " Heart sounds: Normal heart sounds. No murmur heard.     No friction rub. No gallop.   Pulmonary:      Effort: Pulmonary effort is normal. No respiratory distress.      Breath sounds: Normal breath sounds. No wheezing or rales.   Musculoskeletal:      Cervical back: Neck supple.   Skin:     General: Skin is warm and dry.   Neurological:      Mental Status: She is alert and oriented to person, place, and time.   Psychiatric:         Judgment: Judgment normal.       Radha Butts PA-C

## 2024-02-12 ENCOUNTER — TELEPHONE (OUTPATIENT)
Dept: NEUROLOGY | Facility: CLINIC | Age: 34
End: 2024-02-12

## 2024-02-22 DIAGNOSIS — F41.1 ANXIETY, GENERALIZED: ICD-10-CM

## 2024-02-22 DIAGNOSIS — F33.9 DEPRESSION, RECURRENT (HCC): ICD-10-CM

## 2024-02-22 RX ORDER — BUPROPION HYDROCHLORIDE 150 MG/1
150 TABLET ORAL EVERY MORNING
Qty: 90 TABLET | Refills: 1 | Status: SHIPPED | OUTPATIENT
Start: 2024-02-22

## 2024-02-26 ENCOUNTER — TELEPHONE (OUTPATIENT)
Dept: NEUROLOGY | Facility: CLINIC | Age: 34
End: 2024-02-26

## 2024-02-29 DIAGNOSIS — G43.909 MIGRAINE WITHOUT STATUS MIGRAINOSUS, NOT INTRACTABLE, UNSPECIFIED MIGRAINE TYPE: ICD-10-CM

## 2024-02-29 RX ORDER — CANDESARTAN 16 MG/1
16 TABLET ORAL DAILY
Qty: 90 TABLET | Refills: 1 | Status: SHIPPED | OUTPATIENT
Start: 2024-02-29

## 2024-03-05 ENCOUNTER — OFFICE VISIT (OUTPATIENT)
Dept: NEUROLOGY | Facility: CLINIC | Age: 34
End: 2024-03-05
Payer: COMMERCIAL

## 2024-03-05 VITALS
DIASTOLIC BLOOD PRESSURE: 70 MMHG | HEART RATE: 69 BPM | OXYGEN SATURATION: 99 % | SYSTOLIC BLOOD PRESSURE: 122 MMHG | BODY MASS INDEX: 34.81 KG/M2 | WEIGHT: 196.5 LBS | TEMPERATURE: 97.8 F

## 2024-03-05 DIAGNOSIS — G43.109 MIGRAINE WITH AURA AND WITHOUT STATUS MIGRAINOSUS, NOT INTRACTABLE: Primary | ICD-10-CM

## 2024-03-05 PROCEDURE — 99204 OFFICE O/P NEW MOD 45 MIN: CPT

## 2024-03-05 RX ORDER — RIMEGEPANT SULFATE 75 MG/75MG
75 TABLET, ORALLY DISINTEGRATING ORAL AS NEEDED
Qty: 16 TABLET | Refills: 3 | Status: SHIPPED | OUTPATIENT
Start: 2024-03-05

## 2024-03-05 NOTE — PATIENT INSTRUCTIONS
Patient Instructions:    Headache Calendar  Please maintain a headache calendar  Consider using phone applications such as Migraine Gama or Migraine Diary    Headache/migraine treatment:     Rescue medications (for immediate treatment of a headache):   It is ok to take ibuprofen, acetaminophen or naproxen (Advil, Tylenol,  Aleve, Excedrin) if they help your headaches you should limit these to No more than 3 times a week to avoid medication overuse/rebound headaches.     For your more moderate to severe migraines take this medication early   - Continue Maxalt (rizatriptan) 10mg tabs - take one at the onset of headache. May repeat one time after 2 hours if pain has not resolved.   (Max 2 a day and 10 a month)     - Start Nurtec 75 mg, take 1 tablet by mouth at the onset of a headache.  Max dose: 75 mg per 24 hours.    Over the counter preventive supplements for headaches/migraines (if you try, try for 3 months straight)  (to take every day to help prevent headaches - not to take at the time of headache):  There are combo pills online of these - none of which regulated by FDA and double check dosing - take appropriate dose only once a day- prevent a migraine, migravent, mind ease, migrelief   [x] Magnesium oxide or magnesium glycinate 400mg daily (If any diarrhea or upset stomach, decrease dose  as tolerated)  [x] Riboflavin (Vitamin B2) 400mg daily (may make your urine bright/neon yellow)    - All supplements can be purchased online     Lifestyle Recommendations:  [x] SLEEP - Maintain a regular sleep schedule: Adults need at least 7-8 hours of uninterrupted a night. Maintain good sleep hygiene:  Going to bed and waking up at consistent times, avoiding excessive daytime naps, avoiding caffeinated beverages in the evening, avoid excessive stimulation in the evening and generally using bed primarily for sleeping.  One hour before bedtime would recommend turning lights down lower, decreasing your activity (may read  quietly, listen to music at a low volume). When you get into bed, should eliminate all technology (no texting, emailing, playing with your phone, iPad or tablet in bed).  [x] HYDRATION - Maintain good hydration.  Drink  2L of fluid a day (4 typical small water bottles)  [x] DIET - Maintain good nutrition. In particular don't skip meals and try and eat healthy balanced meals regularly.  [x] TRIGGERS - Look for other triggers and avoid them: Limit caffeine to 1-2 cups a day or less. Avoid dietary triggers that you have noticed bring on your headaches (this could include aged cheese, peanuts, MSG, aspartame and nitrates).  [x] EXERCISE - physical exercise as we all know is good for you in many ways, and not only is good for your heart, but also is beneficial for your mental health, cognitive health and  chronic pain/headaches. I would encourage at the least 5 days of physical exercise weekly for at least 30 minutes.     Education and Follow-up  [x] Please call with any questions or concerns. Of course if any new concerning symptoms go to the emergency department.  [x] Follow up in 4 months with Darrell RIDER

## 2024-03-05 NOTE — PROGRESS NOTES
[facility] Consult  PATIENT:  Renae Mancia  MRN:  [id number]  :  1990  DATE OF SERVICE:  3/5/2024  REFERRED BY: Self, Referral  PMD: Radha Butts PA-C    Assessment/Plan:     Renae Mancia is a very pleasant 33 y.o. female with a past medical history that includes migraine headache, irritable bowel syndrome, GERD, sciatica, degenerative disc disease, history of tobacco use, anxiety, depression, shortness of breath referred here for evaluation of headache.    Initial evaluation 3/5/2024    Migraine with aura without status migrainosus:    I had the pleasure of seeing Renae today in the office at [facility] in [place].  She is presenting today for an initial new patient consultation in regard to headaches.  She noted that she started getting headaches around the age of 28 years old.  She notes that she has anywhere between 4-15 headache days out of the month, about 3 out of 30 days or more severe debilitating days.  She notes that the headaches will only last about 1 to 2 hours when she takes the rizatriptan, and she is finding some time she is having to take rizatriptan 2-3 times out of the week.  She does note a significant aura with her headaches, colorful triangle kaleidoscope vision for about 15 minutes before the headache comes on.  Patient describes migrainous symptoms of nausea, problems with concentration, photophobia, phonophobia, and lightheadedness/dizziness.  Exertion seems to make the headaches worse.  No positional change headache component as well.  It was noted that the patient used to see [facility] neurology back in  for what were felt to be migraine headaches.  From my evaluation does seem like the patient does certainly have migraine headaches with aura at this time.  The patient does not report  ever having any neuroimaging in the past.  However, due to the characteristics of the patient's headaches we are not  concerned for any secondary headache disorders at this time.  We have opted to not pursue any MRI brain imaging at this time but certainly the option of having this in the future is open.  The patient had noted more recent rebound headaches with taking the Maxalt and was having to take the Maxalt quite often throughout the week.  Since the patient had already tried sumatriptan as well, prescribed Nurtec 75 mg for abortive therapy.  The patient was going to start this in place of the Maxalt, but if she took 1 dosage of Nurtec she could also take Maxalt a few hours later as she did not seem to think the Nurtec was effective.  For preventative therapy, the patient had opted for potentially trying migraine supplementation with magnesium and riboflavin and seeing how the Nurtec works for abortive therapy first.  Patient is to follow-up in about 4 months time.    Patient Instructions:    Headache Calendar  Please maintain a headache calendar  Consider using phone applications such as Migraine Gama or Migraine Diary    Headache/migraine treatment:     Rescue medications (for immediate treatment of a headache):   It is ok to take ibuprofen, acetaminophen or naproxen (Advil, Tylenol,  Aleve, Excedrin) if they help your headaches you should limit these to No more than 3 times a week to avoid medication overuse/rebound headaches.     For your more moderate to severe migraines take this medication early   - Continue Maxalt (rizatriptan) 10mg tabs - take one at the onset of headache. May repeat one time after 2 hours if pain has not resolved.   (Max 2 a day and 10 a month)     - Start Nurtec 75 mg, take 1 tablet by mouth at the onset of a headache.  Max dose: 75 mg per 24 hours.    Over the counter preventive supplements for headaches/migraines (if you try, try for 3 months straight)  (to take every day to help prevent headaches - not to take at the time of headache):  There are combo pills online of these - none of which  regulated by FDA and double check dosing - take appropriate dose only once a day- prevent a migraine, migravent, mind ease, migrelief   [x] Magnesium oxide or magnesium glycinate 400mg daily (If any diarrhea or upset stomach, decrease dose  as tolerated)  [x] Riboflavin (Vitamin B2) 400mg daily (may make your urine bright/neon yellow)    - All supplements can be purchased online     Lifestyle Recommendations:  [x] SLEEP - Maintain a regular sleep schedule: Adults need at least 7-8 hours of uninterrupted a night. Maintain good sleep hygiene:  Going to bed and waking up at consistent times, avoiding excessive daytime naps, avoiding caffeinated beverages in the evening, avoid excessive stimulation in the evening and generally using bed primarily for sleeping.  One hour before bedtime would recommend turning lights down lower, decreasing your activity (may read quietly, listen to music at a low volume). When you get into bed, should eliminate all technology (no texting, emailing, playing with your phone, iPad or tablet in bed).  [x] HYDRATION - Maintain good hydration.  Drink  2L of fluid a day (4 typical small water bottles)  [x] DIET - Maintain good nutrition. In particular don't skip meals and try and eat healthy balanced meals regularly.  [x] TRIGGERS - Look for other triggers and avoid them: Limit caffeine to 1-2 cups a day or less. Avoid dietary triggers that you have noticed bring on your headaches (this could include aged cheese, peanuts, MSG, aspartame and nitrates).  [x] EXERCISE - physical exercise as we all know is good for you in many ways, and not only is good for your heart, but also is beneficial for your mental health, cognitive health and  chronic pain/headaches. I would encourage at the least 5 days of physical exercise weekly for at least 30 minutes.     Education and Follow-up  [x] Please call with any questions or concerns. Of course if any new concerning symptoms go to the emergency  department.  [x] Follow up in 4 months with Darrell RIDER     CC:   We had the pleasure of evaluating Renae Mancia in neurological consultation today. Renae Mancia is a  33 y.o. female who presents today for evaluation of headaches.     History obtained from patient as well as available medical record review.  History of Present Illness:   Current medical illnesses  or past medical history include migraine headache, irritable bowel syndrome, GERD, sciatica, degenerative disc disease, history of tobacco use, anxiety, depression, shortness of breath      Interval History:    Headaches started at what age? 28 years old  How often do the headaches occur?   - as of 3/5/2024: 4-15/30 days out of the month. 3/30 severe, debilitating headaches    What time of the day do the headaches start?  Afternoon or evening headaches  How long do the headaches last? 1-2 hours when she is able to take the rizatriptan. Taking Rizatriptan 2-3 times a week at this point. Rebound migraines with rizatriptan use now, 2-3 times a week.   Are you ever headache free? Yes    Aura? with aura, Colorful triangle kaleidoscope vision, 15 minutes before the migraine comes on     Where is your headache located and pain quality? Left sided of the head, travels around whole head. Pounding type pain   What is the intensity of pain? Worst 10/10, Average: 7/10  Associated symptoms:   [x] Nausea  [x] Problems with concentration  [x] Photophobia     [x]Phonophobia  [x] Prefer quiet, dark room  [x] Light-headed or dizzy     [x] Lacrimation     Things that make the headache worse? Exertion makes them worse     Any positional change headaches? No positional change headaches     Headache triggers:  stress, hormonal, dehydration, lack of sleep, not eating on time, alcohol    Have you seen someone else for headaches or pain? Yes, [facility] neurology back in 2021  Have you had trigger point injection performed and how often? No  Have you had Botox injection performed and  how often? No   Have you had epidural injections or transforaminal injections performed? Yes, corticosteroid injection in low back for disc herniation about 3 times now   Are you current pregnant or planning on getting pregnant? No, sexually active and cycle tracking for contraception.   Have you ever had any Brain imaging? no    Last eye exam: last spring was her last eye exam. No history of optic disc swelling or papilledema but has had elevated intraocular pressure readings in the past.     What medications do you take or have you taken for your headaches?   ABORTIVE:    OTC medications: None  Prescription: Maxalt     Past/ failed/contraindicated:  OTC medications: Tylenol, ibuprofen, Excedrin migraine   Prescription: Sumatriptan (not effective) Nurtec (insurance purposes)     PREVENTIVE:   Lexapro, Wellbutrin     Past/ failed/contraindicated:  Candesartan (did not try)       LIFESTYLE  Sleep   - averages: 6-7 hours   Problems falling asleep?:   No  Problems staying asleep?:  No    - Does have some issues with snoring. No trouble breathing or gasping for air in her sleep.     Physical activity: trying to be more active throughout the week     Water: 60-70 ounces of water per day  Caffeine: 1 large cup of coffee per day    Mood: Depression and anxiety. Well under control with Lexapro and Wellbutrin for her antidepressant medications. Looking in trying to find a psychiatrist at this time.     The following portions of the patient's history were reviewed and updated as appropriate: allergies, current medications, past family history, past medical history, past social history, past surgical history and problem list.    Pertinent family history:  Family history of headaches:  migraine headaches in mother  Any family history of aneurysms - No    Pertinent social history:  Work: works for non-profit   Education: Bachelor's degree  Lives with      Illicit Drugs: denies  Alcohol/tobacco: Denies tobacco use, alcohol  intake: one cigarette every once and awhile    Past Medical History:     Past Medical History:   Diagnosis Date    Abnormal Pap smear of cervix 07/2022 7/2022ASCUS with pos HPV-; [id number] neg, Biopsies negative    Bilateral ocular hypertension     Low back pain     Migraine     w/ aura only when using combined hormonal contraception    Sciatica     left sided       Patient Active Problem List   Diagnosis    Migraine headache    DDD (degenerative disc disease), lumbosacral    Intervertebral disc disorder with radiculopathy of lumbar region    Irritable bowel syndrome with both constipation and diarrhea    Anxiety, generalized    Pelvic pain in female    Sciatica of left side associated with disorder of lumbar spine    History of tobacco use    SOB (shortness of breath)    Depression, recurrent (HCC)    Diarrhea    Gastroesophageal reflux disease without esophagitis    Hypoglycemia    Fatigue    Snoring       Medications:      Current Outpatient Medications   Medication Sig Dispense Refill    buPROPion (WELLBUTRIN XL) 150 mg 24 hr tablet Take 1 tablet (150 mg total) by mouth every morning 90 tablet 1    candesartan (ATACAND) 16 mg tablet TAKE 1 TABLET BY MOUTH EVERY DAY 90 tablet 1    escitalopram (LEXAPRO) 5 mg tablet Take 1 tablet (5 mg total) by mouth daily 90 tablet 2    rimegepant sulfate (Nurtec) 75 mg TBDP Take 1 tablet (75 mg total) by mouth if needed (migrane) 48 tablet 0    rizatriptan (MAXALT) 10 mg tablet Take 1 tablet (10 mg total) by mouth once as needed for migraine for up to 1 dose may repeat in 2 hours if necessary 10 tablet 5     No current facility-administered medications for this visit.        Allergies:      Allergies   Allergen Reactions    Treenut [Nuts - Food Allergy] Swelling     Tree nuts       Family History:     Family History   Problem Relation Age of Onset    Hypertension Mother     Arthritis Mother     Hypothyroidism Mother     Diabetes Father     Diabetes type II Father      Diabetes Maternal Uncle     Diabetes type II Paternal Uncle     Diabetes type II Brother     Breast cancer Neg Hx     Colon cancer Neg Hx     Ovarian cancer Neg Hx     Uterine cancer Neg Hx        Social History:       Social History     Socioeconomic History    Marital status: Single     Spouse name: Not on file    Number of children: Not on file    Years of education: Not on file    Highest education level: Not on file   Occupational History    Not on file   Tobacco Use    Smoking status: Former     Current packs/day: 0.00     Average packs/day: 0.5 packs/day for 12.0 years (6.0 ttl pk-yrs)     Types: Cigarettes     Start date: 9/1/2008     Quit date: 9/1/2020     Years since quitting: 3.5    Smokeless tobacco: Never   Vaping Use    Vaping status: Former   Substance and Sexual Activity    Alcohol use: Yes     Alcohol/week: 4.0 standard drinks of alcohol     Types: 4 Standard drinks or equivalent per week     Comment: occasionally    Drug use: Never     Types: Marijuana, LSD, MDMA (ecstacy)     Comment: over 10 years ago    Sexual activity: Yes     Partners: Male     Birth control/protection: Coitus interruptus     Comment: Nexplanon removed on 9/2/21   Other Topics Concern    Not on file   Social History Narrative    Not on file     Social Determinants of Health     Financial Resource Strain: Not on file   Food Insecurity: Not on file   Transportation Needs: Not on file   Physical Activity: Not on file   Stress: Not on file   Social Connections: Not on file   Intimate Partner Violence: Not on file   Housing Stability: Not on file         Objective:     Physical Exam:                                                                 Vitals:            Constitutional:    /70 (BP Location: Left arm, Patient Position: Sitting, Cuff Size: Adult)   Pulse 69   Temp 97.8 °F (36.6 °C) (Temporal)   Wt 89.1 kg (196 lb 8 oz)   SpO2 99%   BMI 34.81 kg/m²   BP Readings from Last 3 Encounters:   03/05/24 122/70    02/06/24 118/68   01/17/24 126/78     Pulse Readings from Last 3 Encounters:   03/05/24 69   02/06/24 68   01/17/24 88         Well developed, well nourished, well groomed. No dysmorphic features.       Psychiatric:  Normal behavior and appropriate affect        Neurological Examination:     Mental status/cognitive function:   Orientated to time, place and person. Recent and remote memory intact. Attention span and concentration as well as fund of knowledge are appropriate for age. Normal language and spontaneous speech.    Cranial Nerves:  II-visual fields full.   III, IV, VI-Pupils were equal, round, and reactive to light and accomodation. Extraocular movements were full and conjugate without nystagmus. Conjugate gaze, normal smooth pursuits, normal saccades   V-facial sensation symmetric.    VII-facial expression symmetric, intact forehead wrinkle, strong eye closure, symmetric smile    VIII-hearing grossly intact bilaterally   IX, X-palate elevation symmetric, no dysarthria.   XI-shoulder shrug strength intact    XII-tongue protrusion midline.    Motor Exam: symmetric bulk and tone throughout, no pronator drift. Power/strength 5/5 bilateral upper and lower extremities, no atrophy, fasciculations or abnormal movements noted.   Sensory: grossly intact light touch in all extremities.   Reflexes: brachioradialis 2+, biceps 2+, knee 2+ bilaterally  Coordination: Finger nose finger intact bilaterally, no apparent dysmetria, ataxia or tremor noted  Gait: steady casual and tandem gait.         Pertinent Imaging: No pertinent imaging available         Review of Systems:     Review of Systems   Constitutional:  Negative for appetite change, fatigue and fever.   HENT: Negative.  Negative for hearing loss, tinnitus, trouble swallowing and voice change.    Eyes:  Positive for pain and visual disturbance. Negative for photophobia.   Respiratory: Negative.  Negative for shortness of breath.    Cardiovascular: Negative.   Negative for palpitations.   Gastrointestinal: Negative.  Negative for nausea and vomiting.   Endocrine: Negative.  Negative for cold intolerance.   Genitourinary: Negative.  Negative for dysuria, frequency and urgency.   Musculoskeletal:  Negative for back pain, gait problem, myalgias, neck pain and neck stiffness.   Skin: Negative.  Negative for rash.   Allergic/Immunologic: Negative.    Neurological:  Positive for headaches. Negative for dizziness, tremors, seizures, syncope, facial asymmetry, speech difficulty, weakness, light-headedness and numbness.   Hematological: Negative.  Does not bruise/bleed easily.   Psychiatric/Behavioral: Negative.  Negative for confusion, hallucinations and sleep disturbance.    All other systems reviewed and are negative.       I have spent 45 minutes with the patient today in which greater than 50% of this time was spent in counseling/coordination of care regarding Risks and benefits of tx options, Instructions for management, Patient and family education, Importance of tx compliance, Risk factor reductions, Impressions, Counseling / Coordination of care, Documenting in the medical record, Reviewing / ordering tests, medicine, procedures  , and Obtaining or reviewing history  . I also spent 15 minutes non face to face for this patient the same day.     Activity Minutes   Precharting/reviewing 5   Patient care/counseling 45   Postcharting/care coordination 10       Author:  David Morelos PA-C 3/5/2024 12:29 PM

## 2024-03-05 NOTE — PROGRESS NOTES
Review of Systems   Constitutional:  Negative for appetite change, fatigue and fever.   HENT: Negative.  Negative for hearing loss, tinnitus, trouble swallowing and voice change.    Eyes:  Positive for pain and visual disturbance. Negative for photophobia.   Respiratory: Negative.  Negative for shortness of breath.    Cardiovascular: Negative.  Negative for palpitations.   Gastrointestinal: Negative.  Negative for nausea and vomiting.   Endocrine: Negative.  Negative for cold intolerance.   Genitourinary: Negative.  Negative for dysuria, frequency and urgency.   Musculoskeletal:  Negative for back pain, gait problem, myalgias, neck pain and neck stiffness.   Skin: Negative.  Negative for rash.   Allergic/Immunologic: Negative.    Neurological:  Positive for headaches. Negative for dizziness, tremors, seizures, syncope, facial asymmetry, speech difficulty, weakness, light-headedness and numbness.   Hematological: Negative.  Does not bruise/bleed easily.   Psychiatric/Behavioral: Negative.  Negative for confusion, hallucinations and sleep disturbance.    All other systems reviewed and are negative.

## 2024-04-11 ENCOUNTER — TELEPHONE (OUTPATIENT)
Dept: NEUROLOGY | Facility: CLINIC | Age: 34
End: 2024-04-11

## 2024-04-11 NOTE — TELEPHONE ENCOUNTER
VM 4/9 at 12:55 pm:    I my name is Renae Mancia. I saw Dr. Morelos in the [place] location last month and he prescribed a new medication for me. He said that it needed a prior authorization with my insurance and that it might take a couple of weeks for it to go through, but it has been about a month and a week. So I just wanted to follow up and see what the status is of that medication is and if we can move things along. So thanks so much. # [phone]  ________________________________________________    Called insurance company #[phone]. Prior auth completed over the phone. Prior auth #[id number]. Last OV note faxed to #[phone]. Called pt and made her aware the PA was submitted and we are waiting for a determination.

## 2024-05-08 ENCOUNTER — OFFICE VISIT (OUTPATIENT)
Dept: FAMILY MEDICINE CLINIC | Facility: CLINIC | Age: 34
End: 2024-05-08
Payer: COMMERCIAL

## 2024-05-08 VITALS
WEIGHT: 186 LBS | BODY MASS INDEX: 32.96 KG/M2 | DIASTOLIC BLOOD PRESSURE: 72 MMHG | SYSTOLIC BLOOD PRESSURE: 118 MMHG | HEART RATE: 72 BPM | HEIGHT: 63 IN

## 2024-05-08 DIAGNOSIS — F33.9 DEPRESSION, RECURRENT (HCC): ICD-10-CM

## 2024-05-08 DIAGNOSIS — K58.2 IRRITABLE BOWEL SYNDROME WITH BOTH CONSTIPATION AND DIARRHEA: Primary | ICD-10-CM

## 2024-05-08 DIAGNOSIS — R10.11 RIGHT UPPER QUADRANT ABDOMINAL PAIN: ICD-10-CM

## 2024-05-08 DIAGNOSIS — F41.1 ANXIETY, GENERALIZED: ICD-10-CM

## 2024-05-08 PROCEDURE — 99214 OFFICE O/P EST MOD 30 MIN: CPT | Performed by: PHYSICIAN ASSISTANT

## 2024-05-08 RX ORDER — BUPROPION HYDROCHLORIDE 300 MG/1
300 TABLET ORAL EVERY MORNING
Qty: 90 TABLET | Refills: 3 | Status: SHIPPED | OUTPATIENT
Start: 2024-05-08

## 2024-05-08 NOTE — ASSESSMENT & PLAN NOTE
Pt is on both wellbutrin 150 and lexapro 5 and feeling that she is not at goal and astill with fatigue so will increase to 300 of welbutrin and check in 4-6 weeks.

## 2024-05-08 NOTE — ASSESSMENT & PLAN NOTE
Having more issues with diarrhea and loose stools vs constipation. Feels that there is something in her colon that is preventing her from evacuating effectively. Also getting pains after eating in her R abd. More gas than usual and has changed her diet with more fruits and veggies and has lost 10 pounds since her last apt. Would like to see gastro for further eval. Order placed.

## 2024-05-08 NOTE — PATIENT INSTRUCTIONS
1. Irritable bowel syndrome with both constipation and diarrhea  Assessment & Plan:  Having more issues with diarrhea and loose stools vs constipation. Feels that there is something in her colon that is preventing her from evacuating effectively. Also getting pains after eating in her R abd. More gas than usual and has changed her diet with more fruits and veggies and has lost 10 pounds since her last apt. Would like to see gastro for further eval. Order placed.     Orders:  -     Ambulatory Referral to Gastroenterology; Future    2. Depression, recurrent (HCC)  Assessment & Plan:  Pt is on both wellbutrin 150 and lexapro 5 and feeling that she is not at goal and astill with fatigue so will increase to 300 of welbutrin and check in 4-6 weeks.     Orders:  -     buPROPion (WELLBUTRIN XL) 300 mg 24 hr tablet; Take 1 tablet (300 mg total) by mouth every morning    3. Anxiety, generalized  -     buPROPion (WELLBUTRIN XL) 300 mg 24 hr tablet; Take 1 tablet (300 mg total) by mouth every morning    4. Right upper quadrant abdominal pain  Assessment & Plan:  Check US abd r/o gallbladder disease. Getting RUQ and side pain after eating in episodes that can last all day waxing and waning. Area is always tender to touch but the episodes last maybe weekly. Could be possible duodenitis as pt has had hx GERD but this dx has been stable w/o meds for a few years now.     Orders:  -     Ambulatory Referral to Gastroenterology; Future  -     US abdomen complete; Future; Expected date: 05/08/2024

## 2024-05-08 NOTE — ASSESSMENT & PLAN NOTE
Check US abd r/o gallbladder disease. Getting RUQ and side pain after eating in episodes that can last all day waxing and waning. Area is always tender to touch but the episodes last maybe weekly. Could be possible duodenitis as pt has had hx GERD but this dx has been stable w/o meds for a few years now.

## 2024-05-08 NOTE — PROGRESS NOTES
Name: Renae Mancia      : 1990      MRN: [id number]  Encounter Provider: Radha Butts PA-C  Encounter Date: 2024   Encounter department: [facility]    Assessment & Plan     1. Irritable bowel syndrome with both constipation and diarrhea  Assessment & Plan:  Having more issues with diarrhea and loose stools vs constipation. Feels that there is something in her colon that is preventing her from evacuating effectively. Also getting pains after eating in her R abd. More gas than usual and has changed her diet with more fruits and veggies and has lost 10 pounds since her last apt. Would like to see gastro for further eval. Order placed.     Orders:  -     Ambulatory Referral to Gastroenterology; Future    2. Depression, recurrent (HCC)  Assessment & Plan:  Pt is on both wellbutrin 150 and lexapro 5 and feeling that she is not at goal and astill with fatigue so will increase to 300 of welbutrin and check in 4-6 weeks.     Orders:  -     buPROPion (WELLBUTRIN XL) 300 mg 24 hr tablet; Take 1 tablet (300 mg total) by mouth every morning    3. Anxiety, generalized  -     buPROPion (WELLBUTRIN XL) 300 mg 24 hr tablet; Take 1 tablet (300 mg total) by mouth every morning    4. Right upper quadrant abdominal pain  Assessment & Plan:  Check US abd r/o gallbladder disease. Getting RUQ and side pain after eating in episodes that can last all day waxing and waning. Area is always tender to touch but the episodes last maybe weekly. Could be possible duodenitis as pt has had hx GERD but this dx has been stable w/o meds for a few years now.     Orders:  -     Ambulatory Referral to Gastroenterology; Future  -     US abdomen complete; Future; Expected date: 2024        Depression Screening and Follow-up Plan: Patient's depression screening was positive with a PHQ-9 score of 5. Patient advised to follow-up with PCP for further management.         Subjective      Patient presents  "with:  Depression: Would like to discuss increase on wellbutrin   GI Problem: Constipation one week and another week having diarrhea           Review of Systems   Constitutional: Negative.    HENT: Negative.     Eyes: Negative.    Respiratory: Negative.     Cardiovascular: Negative.    Gastrointestinal: Negative.    Endocrine: Negative.    Genitourinary: Negative.    Musculoskeletal: Negative.    Skin: Negative.    Allergic/Immunologic: Negative.    Neurological: Negative.    Hematological: Negative.    Psychiatric/Behavioral: Negative.         Current Outpatient Medications on File Prior to Visit   Medication Sig   • escitalopram (LEXAPRO) 5 mg tablet Take 1 tablet (5 mg total) by mouth daily   • rimegepant sulfate (Nurtec) 75 mg TBDP Take 1 tablet (75 mg total) by mouth if needed (migrane)   • rizatriptan (MAXALT) 10 mg tablet Take 1 tablet (10 mg total) by mouth once as needed for migraine for up to 1 dose may repeat in 2 hours if necessary   • [DISCONTINUED] buPROPion (WELLBUTRIN XL) 150 mg 24 hr tablet Take 1 tablet (150 mg total) by mouth every morning       Objective     /72 (BP Location: Right arm, Patient Position: Sitting, Cuff Size: Adult)   Pulse 72   Ht 5' 3\" (1.6 m)   Wt 84.4 kg (186 lb)   BMI 32.95 kg/m²     Physical Exam  Vitals and nursing note reviewed.   Constitutional:       General: She is not in acute distress.     Appearance: She is well-developed. She is not diaphoretic.   HENT:      Head: Normocephalic and atraumatic.      Nose: Nose normal.   Eyes:      General:         Right eye: No discharge.         Left eye: No discharge.      Conjunctiva/sclera: Conjunctivae normal.   Neck:      Vascular: No carotid bruit.   Cardiovascular:      Rate and Rhythm: Normal rate and regular rhythm.      Heart sounds: Normal heart sounds. No murmur heard.     No friction rub. No gallop.   Pulmonary:      Effort: Pulmonary effort is normal. No respiratory distress.      Breath sounds: Normal breath " sounds. No wheezing or rales.   Abdominal:      General: Abdomen is protuberant. Bowel sounds are normal.      Palpations: Abdomen is soft.      Tenderness: There is abdominal tenderness in the right upper quadrant. There is no right CVA tenderness or left CVA tenderness.       Musculoskeletal:      Cervical back: Neck supple.   Skin:     General: Skin is warm and dry.   Neurological:      Mental Status: She is alert and oriented to person, place, and time.   Psychiatric:         Judgment: Judgment normal.       Radha Butts PA-C

## 2024-05-17 ENCOUNTER — HOSPITAL ENCOUNTER (OUTPATIENT)
Dept: ULTRASOUND IMAGING | Facility: HOSPITAL | Age: 34
Discharge: HOME | End: 2024-05-17
Payer: COMMERCIAL

## 2024-05-17 DIAGNOSIS — R10.11 RIGHT UPPER QUADRANT ABDOMINAL PAIN: ICD-10-CM

## 2024-05-17 PROCEDURE — 76700 US EXAM ABDOM COMPLETE: CPT

## 2024-05-23 ENCOUNTER — TELEPHONE (OUTPATIENT)
Dept: NEUROLOGY | Facility: CLINIC | Age: 34
End: 2024-05-23

## 2024-05-23 NOTE — TELEPHONE ENCOUNTER
Called in regards to his upcoming appointment with nemo. As the provider will be out of office and we will need to r/s. Pt accepted a new appointment on  7/8/24 @ 1:30 PM.

## 2024-05-29 ENCOUNTER — TELEPHONE (OUTPATIENT)
Dept: FAMILY MEDICINE CLINIC | Facility: CLINIC | Age: 34
End: 2024-05-29

## 2024-05-29 NOTE — TELEPHONE ENCOUNTER
----- Message from Radha Butts PA-C sent at 5/29/2024  8:34 AM EDT -----  Please let the patient know that her ultrasound abdomen was within normal limits.  Keep her follow-up with gastroenterology in July.

## 2024-06-29 DIAGNOSIS — G43.109 MIGRAINE WITH AURA AND WITHOUT STATUS MIGRAINOSUS, NOT INTRACTABLE: ICD-10-CM

## 2024-07-01 ENCOUNTER — TELEPHONE (OUTPATIENT)
Dept: NEUROLOGY | Facility: CLINIC | Age: 34
End: 2024-07-01

## 2024-07-01 RX ORDER — RIMEGEPANT SULFATE 75 MG/75MG
TABLET, ORALLY DISINTEGRATING ORAL
Qty: 16 TABLET | Refills: 2 | Status: SHIPPED | OUTPATIENT
Start: 2024-07-01

## 2024-07-08 ENCOUNTER — OFFICE VISIT (OUTPATIENT)
Dept: NEUROLOGY | Facility: CLINIC | Age: 34
End: 2024-07-08
Payer: COMMERCIAL

## 2024-07-08 VITALS
SYSTOLIC BLOOD PRESSURE: 122 MMHG | TEMPERATURE: 97.9 F | OXYGEN SATURATION: 93 % | WEIGHT: 184.9 LBS | BODY MASS INDEX: 32.75 KG/M2 | HEART RATE: 63 BPM | DIASTOLIC BLOOD PRESSURE: 68 MMHG

## 2024-07-08 DIAGNOSIS — G43.109 MIGRAINE WITH AURA AND WITHOUT STATUS MIGRAINOSUS, NOT INTRACTABLE: Primary | ICD-10-CM

## 2024-07-08 DIAGNOSIS — F33.9 DEPRESSION, RECURRENT (HCC): ICD-10-CM

## 2024-07-08 DIAGNOSIS — F41.1 ANXIETY, GENERALIZED: ICD-10-CM

## 2024-07-08 PROCEDURE — 99213 OFFICE O/P EST LOW 20 MIN: CPT

## 2024-07-08 RX ORDER — AMITRIPTYLINE HYDROCHLORIDE 10 MG/1
10 TABLET ORAL
Qty: 30 TABLET | Refills: 3 | Status: SHIPPED | OUTPATIENT
Start: 2024-07-08 | End: 2024-08-20

## 2024-07-08 NOTE — PATIENT INSTRUCTIONS
Patient Instructions:    Headache Calendar  Please maintain a headache calendar  Consider using phone applications such as Migraine Gama or Migraine Diary    Headache/migraine treatment:     Rescue medications (for immediate treatment of a headache):   It is ok to take ibuprofen, acetaminophen or naproxen (Advil, Tylenol,  Aleve, Excedrin) if they help your headaches you should limit these to No more than 3 times a week to avoid medication overuse/rebound headaches.     For your more moderate to severe migraines take this medication early   - Continue Maxalt (rizatriptan) 10mg tabs - take one at the onset of headache. May repeat one time after 2 hours if pain has not resolved.   (Max 2 a day and 10 a month)     - Continue Nurtec 75 mg, take 1 tablet by mouth once at the onset of a significant headache.  Max dose: 75 mg/day    Prescription preventive medications for headaches/migraines   (to take every day to help prevent headaches - not to take at the time of headache):  - Start amitriptyline 10 mg, take 1 tablet by mouth once daily at bedtime for migraine prevention.  Patient is going to start with the lowest dosage of the medication, then she is going to reach out to me in about 4 weeks and let me know how the medication is working for her.  At first, she should completely taper off of the Lexapro.  Recommend that she let her primary care know she is tapering off of the Lexapro, start by taking 0.5 tablet by mouth once daily for 1 week.  After this, she can discontinue the medication altogether and start amitriptyline.    *Typically these types of medications take time until you see the benefit, although some may see improvement in days, often it may take weeks, especially if the medication is being titrated up to a beneficial level. Please contact us if there are any concerns or questions regarding the medication.     Over the counter preventive supplements for headaches/migraines (if you try, try for 3 months  straight)  (to take every day to help prevent headaches - not to take at the time of headache):  There are combo pills online of these - none of which regulated by FDA and double check dosing - take appropriate dose only once a day- prevent a migraine, migravent, mind ease, migrelief   [x] Magnesium 400mg daily (If any diarrhea or upset stomach, decrease dose  as tolerated)  [x] Riboflavin (Vitamin B2) 400mg daily (may make your urine bright/neon yellow)    - All supplements can be purchased online    Lifestyle Recommendations:  [x] SLEEP - Maintain a regular sleep schedule: Adults need at least 7-8 hours of uninterrupted a night. Maintain good sleep hygiene:  Going to bed and waking up at consistent times, avoiding excessive daytime naps, avoiding caffeinated beverages in the evening, avoid excessive stimulation in the evening and generally using bed primarily for sleeping.  One hour before bedtime would recommend turning lights down lower, decreasing your activity (may read quietly, listen to music at a low volume). When you get into bed, should eliminate all technology (no texting, emailing, playing with your phone, iPad or tablet in bed).  [x] HYDRATION - Maintain good hydration.  Drink  2L of fluid a day (4 typical small water bottles)  [x] DIET - Maintain good nutrition. In particular don't skip meals and try and eat healthy balanced meals regularly.  [x] TRIGGERS - Look for other triggers and avoid them: Limit caffeine to 1-2 cups a day or less. Avoid dietary triggers that you have noticed bring on your headaches (this could include aged cheese, peanuts, MSG, aspartame and nitrates).  [x] EXERCISE - physical exercise as we all know is good for you in many ways, and not only is good for your heart, but also is beneficial for your mental health, cognitive health and  chronic pain/headaches. I would encourage at the least 5 days of physical exercise weekly for at least 30 minutes.     Education and  Follow-up  [x] Please call with any questions or concerns. Of course if any new concerning symptoms go to the emergency department.  [x] Follow up in 4 months with Darrell RIDER

## 2024-07-08 NOTE — PROGRESS NOTES
[facility]  PATIENT:  Renae Mancia  MRN:  [id number]  :  1990  DATE OF SERVICE:  2024      Assessment/Plan:     Migraine with aura without status migrainosus:    I had the pleasure of seeing Renae today in the office at [facility] in [place].  She is presenting today for an office visit follow-up appointment in regard to her migraines.  The patient states that she is currently having about 10/30 days in the month where she has a headache of some kind, and about 4 or 5/30 days out of the month with a severe debilitating headache.  The patient notes that the Nurtec is not working as effectively as she would like for abortive therapy.  She notes that the rizatriptan is working much better.  Although I did advise the patient to continue with both medications to see if the Nurtec is more effective with better preventative medication regimen.  She had tried magnesium and B2 supplementation for about a month, she noticed no significant difference in regards to migraine frequency and severity.  I did encourage the patient to continue with supplementation, also we will be adding on amitriptyline to her regimen as well for preventative therapy.  Advised patient to follow-up in 4 months time for further evaluation and monitoring.    Patient Instructions:    Headache Calendar  Please maintain a headache calendar  Consider using phone applications such as Migraine Gama or Migraine Diary    Headache/migraine treatment:     Rescue medications (for immediate treatment of a headache):   It is ok to take ibuprofen, acetaminophen or naproxen (Advil, Tylenol,  Aleve, Excedrin) if they help your headaches you should limit these to No more than 3 times a week to avoid medication overuse/rebound headaches.     For your more moderate to severe migraines take this medication early   - Continue Maxalt (rizatriptan) 10mg tabs - take one at the onset of headache. May repeat one  time after 2 hours if pain has not resolved.   (Max 2 a day and 10 a month)     - Continue Nurtec 75 mg, take 1 tablet by mouth once at the onset of a significant headache.  Max dose: 75 mg/day    Prescription preventive medications for headaches/migraines   (to take every day to help prevent headaches - not to take at the time of headache):  - Start amitriptyline 10 mg, take 1 tablet by mouth once daily at bedtime for migraine prevention.  Patient is going to start with the lowest dosage of the medication, then she is going to reach out to me in about 4 weeks and let me know how the medication is working for her.  At first, she should completely taper off of the Lexapro.  Recommend that she let her primary care know she is tapering off of the Lexapro, start by taking 0.5 tablet by mouth once daily for 1 week.  After this, she can discontinue the medication altogether and start amitriptyline.    *Typically these types of medications take time until you see the benefit, although some may see improvement in days, often it may take weeks, especially if the medication is being titrated up to a beneficial level. Please contact us if there are any concerns or questions regarding the medication.     Over the counter preventive supplements for headaches/migraines (if you try, try for 3 months straight)  (to take every day to help prevent headaches - not to take at the time of headache):  There are combo pills online of these - none of which regulated by FDA and double check dosing - take appropriate dose only once a day- prevent a migraine, migravent, mind ease, migrelief   [x] Magnesium 400mg daily (If any diarrhea or upset stomach, decrease dose  as tolerated)  [x] Riboflavin (Vitamin B2) 400mg daily (may make your urine bright/neon yellow)    - All supplements can be purchased online    Lifestyle Recommendations:  [x] SLEEP - Maintain a regular sleep schedule: Adults need at least 7-8 hours of uninterrupted a night.  Maintain good sleep hygiene:  Going to bed and waking up at consistent times, avoiding excessive daytime naps, avoiding caffeinated beverages in the evening, avoid excessive stimulation in the evening and generally using bed primarily for sleeping.  One hour before bedtime would recommend turning lights down lower, decreasing your activity (may read quietly, listen to music at a low volume). When you get into bed, should eliminate all technology (no texting, emailing, playing with your phone, iPad or tablet in bed).  [x] HYDRATION - Maintain good hydration.  Drink  2L of fluid a day (4 typical small water bottles)  [x] DIET - Maintain good nutrition. In particular don't skip meals and try and eat healthy balanced meals regularly.  [x] TRIGGERS - Look for other triggers and avoid them: Limit caffeine to 1-2 cups a day or less. Avoid dietary triggers that you have noticed bring on your headaches (this could include aged cheese, peanuts, MSG, aspartame and nitrates).  [x] EXERCISE - physical exercise as we all know is good for you in many ways, and not only is good for your heart, but also is beneficial for your mental health, cognitive health and  chronic pain/headaches. I would encourage at the least 5 days of physical exercise weekly for at least 30 minutes.     Education and Follow-up  [x] Please call with any questions or concerns. Of course if any new concerning symptoms go to the emergency department.  [x] Follow up in 4 months with Darrell RIDER        History of Present Illness:     For Review:    We had the pleasure of evaluating Renae Mancia in neurological follow up  today for headaches.  As you know,  she is a 33 y.o.   female with a past history of migraine headaches. Patient was last seen in the office at [facility] on 03/05/2024 by myself.  The patient was last seen in the office for an initial new patient consultation in regard to her migraine headaches.  The patient has been having  headaches ever since 28 years old.  She had anywhere between 4-15 headache days a month, with 3/30 days that are more severe debilitating headaches.  Was noted that the patient had been taking rizatriptan which she had not noticed was too effective, it would only last for a few hours of getting rid of the headache.  Did seem that the patient had significant migrainous symptoms associated with her headaches.  There was no positional component to them.  She was seen by ROZINA fatima in 2021 and was felt to have migraine headaches.  From my evaluation it did seem that the patient is having migraine headaches with aura.  The patient does not report ever having any neuroimaging.  However her characteristics of headaches did not seem concerning for secondary headache disorder.  Therefore, we had opted not to pursue any MRI brain imaging at this time.  The patient stated that since Maxalt was not working for her we would likely try a different medication.  She had already tried sumatriptan in the past so therefore we will try Nurtec for abortive therapy.  For preventative therapy the patient wanted to try just magnesium and B2 supplementation.      Current medical illnesses:migraine headache, irritable bowel syndrome, GERD, sciatica, degenerative disc disease, history of tobacco use, anxiety, depression, shortness of breath       What medications do you take or have you taken for your headaches?   Current Preventive:   Magnesium and B2, Lexapro, Wellbutrin    Current Abortive:   Maxalt, Nurtec    Prior Preventive:   Candesartan (did not try)    Prior Abortive:   Tylenol, ibuprofen, Excedrin migraine   Prescription: Sumatriptan (not effective) Nurtec (insurance purposes)     Interval updates as of 7/8/2024:    About 10/30 headaches days in a month, and about 4-5/30 severe or debilitating headaches in a month. She is trying to take the Nurtec as soon as possible with the migraine headache. In recent memory, medication not  working for migraine abortive therapy. Rizatriptan seems to be working better than the Nurtec when taken two hours later. Tried magnesium and B2 supplementation for one month, no significant difference in regards to migraine frequency and severity.     Not drinking enough water and drinking alcohol seems to trigger her migraine headaches for her. She does have mild sleep apnea from a recent sleep study, not sufficient enough to elicit CPAP treatment. She does feel as though the Wellbutrin and Lexapro are working for anxiety and depression    Reviewed old notes from physician seen in the past- see above HPI for summary of previous encounters.       Past Medical History:   Diagnosis Date    Abnormal Pap smear of cervix 07/2022 7/2022ASCUS with pos HPV-; [id number] neg, Biopsies negative    Bilateral ocular hypertension     Low back pain     Migraine     w/ aura only when using combined hormonal contraception    Sciatica     left sided       Patient Active Problem List   Diagnosis    Migraine headache    DDD (degenerative disc disease), lumbosacral    Intervertebral disc disorder with radiculopathy of lumbar region    Irritable bowel syndrome with both constipation and diarrhea    Anxiety, generalized    Pelvic pain in female    Sciatica of left side associated with disorder of lumbar spine    History of tobacco use    SOB (shortness of breath)    Depression, recurrent (HCC)    Diarrhea    Gastroesophageal reflux disease without esophagitis    Hypoglycemia    Fatigue    Snoring    Right upper quadrant abdominal pain       Medications:      Current Outpatient Medications   Medication Sig Dispense Refill    buPROPion (WELLBUTRIN XL) 300 mg 24 hr tablet Take 1 tablet (300 mg total) by mouth every morning 90 tablet 3    escitalopram (LEXAPRO) 5 mg tablet Take 1 tablet (5 mg total) by mouth daily 90 tablet 2    Nurtec 75 MG TBDP TAKE ONE TABLET BY MOUTH AS NEEDED FOR MIGRAINE (MAX 1 TAB/24HR) 16 tablet 2     rizatriptan (MAXALT) 10 mg tablet Take 1 tablet (10 mg total) by mouth once as needed for migraine for up to 1 dose may repeat in 2 hours if necessary 10 tablet 5     No current facility-administered medications for this visit.        Allergies:      Allergies   Allergen Reactions    Treenut [Nuts - Food Allergy] Swelling     Tree nuts       Family History:     Family History   Problem Relation Age of Onset    Hypertension Mother     Arthritis Mother     Hypothyroidism Mother     Diabetes Father     Diabetes type II Father     Diabetes Maternal Uncle     Diabetes type II Paternal Uncle     Diabetes type II Brother     Breast cancer Neg Hx     Colon cancer Neg Hx     Ovarian cancer Neg Hx     Uterine cancer Neg Hx        Social History:     Social History     Socioeconomic History    Marital status: Single     Spouse name: Not on file    Number of children: Not on file    Years of education: Not on file    Highest education level: Not on file   Occupational History    Not on file   Tobacco Use    Smoking status: Former     Current packs/day: 0.00     Average packs/day: 0.5 packs/day for 12.0 years (6.0 ttl pk-yrs)     Types: Cigarettes     Start date: 9/1/2008     Quit date: 9/1/2020     Years since quitting: 3.8    Smokeless tobacco: Never   Vaping Use    Vaping status: Former    Substances: Nicotine   Substance and Sexual Activity    Alcohol use: Yes     Alcohol/week: 4.0 standard drinks of alcohol     Types: 4 Standard drinks or equivalent per week     Comment: occasionally    Drug use: Never     Types: Marijuana, LSD, MDMA (ecstacy)     Comment: over 10 years ago    Sexual activity: Yes     Partners: Male     Birth control/protection: Coitus interruptus     Comment: Nexplanon removed on 9/2/21   Other Topics Concern    Not on file   Social History Narrative    Not on file     Social Determinants of Health     Financial Resource Strain: Not on file   Food Insecurity: Not on file   Transportation Needs: Not on file    Physical Activity: Not on file   Stress: Not on file   Social Connections: Not on file   Intimate Partner Violence: Not on file   Housing Stability: Not on file         Objective:     Physical Exam:                                                                 Vitals:            Constitutional:    /68 (BP Location: Left arm, Patient Position: Sitting, Cuff Size: Adult)   Pulse 63   Temp 97.9 °F (36.6 °C) (Temporal)   Wt 83.9 kg (184 lb 14.4 oz)   SpO2 93%   BMI 32.75 kg/m²   BP Readings from Last 3 Encounters:   07/08/24 122/68   05/08/24 118/72   03/05/24 122/70     Pulse Readings from Last 3 Encounters:   07/08/24 63   05/08/24 72   03/05/24 69         Well developed, well nourished, well groomed. No dysmorphic features.       Psychiatric:  Normal behavior and appropriate affect        Neurological Examination:     Mental status/cognitive function:   Orientated to time, place and person. Recent and remote memory intact. Attention span and concentration as well as fund of knowledge are appropriate for age. Normal language and spontaneous speech.     Cranial Nerves:  II-visual fields full.   III, IV, VI-Pupils were equal, round, and reactive to light and accomodation. Extraocular movements were full and conjugate without nystagmus. Conjugate gaze, normal smooth pursuits, normal saccades   V-facial sensation symmetric.    VII-facial expression symmetric, intact forehead wrinkle, strong eye closure, symmetric smile    VIII-hearing grossly intact bilaterally   IX, X-palate elevation symmetric, no dysarthria.   XI-shoulder shrug strength intact    XII-tongue protrusion midline.    Motor Exam: symmetric bulk and tone throughout, no pronator drift. Power/strength 5/5 bilateral upper and lower extremities, no atrophy, fasciculations or abnormal movements noted.   Sensory: grossly intact light touch in all extremities.   Reflexes: brachioradialis 2+, biceps 2+, knee 2+ bilaterally  Coordination: Finger nose  finger intact bilaterally, no apparent dysmetria, ataxia or tremor noted  Gait: steady casual and tandem gait.       Review of Systems:     Review of Systems   Constitutional:  Negative for appetite change, fatigue and fever.   HENT: Negative.  Negative for hearing loss, tinnitus, trouble swallowing and voice change.    Eyes: Negative.  Negative for photophobia, pain and visual disturbance.   Respiratory: Negative.  Negative for shortness of breath.    Cardiovascular: Negative.  Negative for palpitations.   Gastrointestinal: Negative.  Negative for nausea and vomiting.   Endocrine: Negative.  Negative for cold intolerance.   Genitourinary: Negative.  Negative for dysuria, frequency and urgency.   Musculoskeletal:  Negative for back pain, gait problem, myalgias, neck pain and neck stiffness.   Skin: Negative.  Negative for rash.   Allergic/Immunologic: Negative.    Neurological:  Positive for headaches. Negative for dizziness, tremors, seizures, syncope, facial asymmetry, speech difficulty, weakness, light-headedness and numbness.   Hematological: Negative.  Does not bruise/bleed easily.   Psychiatric/Behavioral: Negative.  Negative for confusion, hallucinations and sleep disturbance.    All other systems reviewed and are negative.    I personally reviewed the ROS entered by the MA    I spent 20 minutes in total time for this visit.    Author:  David Morelos PA-C 7/8/2024 1:51 PM

## 2024-07-16 DIAGNOSIS — F41.1 ANXIETY, GENERALIZED: ICD-10-CM

## 2024-07-16 DIAGNOSIS — F33.9 DEPRESSION, RECURRENT (HCC): ICD-10-CM

## 2024-07-16 RX ORDER — BUPROPION HYDROCHLORIDE 150 MG/1
150 TABLET ORAL EVERY MORNING
Qty: 90 TABLET | Refills: 3 | Status: SHIPPED | OUTPATIENT
Start: 2024-07-16

## 2024-07-18 ENCOUNTER — CONSULT (OUTPATIENT)
Dept: GASTROENTEROLOGY | Facility: MEDICAL CENTER | Age: 34
End: 2024-07-18
Payer: COMMERCIAL

## 2024-07-18 VITALS
TEMPERATURE: 98.4 F | SYSTOLIC BLOOD PRESSURE: 114 MMHG | WEIGHT: 182.6 LBS | OXYGEN SATURATION: 98 % | BODY MASS INDEX: 32.36 KG/M2 | HEART RATE: 66 BPM | DIASTOLIC BLOOD PRESSURE: 81 MMHG | HEIGHT: 63 IN

## 2024-07-18 DIAGNOSIS — R10.31 RIGHT LOWER QUADRANT PAIN: Primary | ICD-10-CM

## 2024-07-18 DIAGNOSIS — K58.2 IRRITABLE BOWEL SYNDROME WITH BOTH CONSTIPATION AND DIARRHEA: ICD-10-CM

## 2024-07-18 DIAGNOSIS — R10.11 RIGHT UPPER QUADRANT ABDOMINAL PAIN: ICD-10-CM

## 2024-07-18 PROCEDURE — 99204 OFFICE O/P NEW MOD 45 MIN: CPT | Performed by: NURSE PRACTITIONER

## 2024-07-18 RX ORDER — MAGNESIUM 30 MG
30 TABLET ORAL 2 TIMES DAILY
COMMUNITY

## 2024-07-18 RX ORDER — DICYCLOMINE HYDROCHLORIDE 10 MG/1
10 CAPSULE ORAL
Qty: 60 CAPSULE | Refills: 3 | Status: SHIPPED | OUTPATIENT
Start: 2024-07-18

## 2024-07-18 NOTE — PROGRESS NOTES
[facility] - Outpatient Consultation  Renae Mancia 33 y.o. female MRN: [id number]  Encounter: [id number]          ASSESSMENT AND PLAN:    Renae Mancia is a 33 y.o. female who presents with complaint of right-sided abdominal pain, constipation alternating with diarrhea.    1. Irritable bowel syndrome with both constipation and diarrhea  2. Right upper quadrant abdominal pain  3.  Right lower quadrant pain    Reports several years of intermittent bouts of right upper quadrant and right lower quadrant cramping/discomfort associated with constipation alternating with diarrhea.  Cannot pinpoint triggers.  Does not awaken her at night.  No weight loss.  Denies any heartburn/reflux, nausea, vomiting or dysphagia.  Occasional bright red blood on wipe if she is straining.  No itching, burning or pain in rectum.  No recent lab work.  Recent ultrasound of the abdomen was normal.  These episodes occur approximately 3 times a month and last several days.  Reports she is a good water drinker and eats fruits and vegetables daily.  She has not tried any medication for the symptoms.  Reports constipation and diarrhea are pretty equal.  We did have a long discussion of possible causes of her symptoms including but not limited to H. pylori, celiac, IBS and IBD.  She is agreeable to obtaining some labs and stool tests to start as well as fiber supplement daily.    -CBC, CMP, TSH, CRP, celiac panel, stool for H. pylori, fecal calprotectin  -Fiber supplement daily  -Bentyl 10 mg every 6 as needed for pain  -Follow-up in office in 3 to 4 months.  If symptoms persist may consider EGD/colonoscopy      ______________________________________________________________________    HPI:    Renae Mancia is a 33 y.o. female who presents with complaint of right upper quadrant pain, constipation alternating with diarrhea.  She has past medical history of migraines, IBS, GERD, DDD, depression.    Reports several years  of intermittent bouts of right upper quadrant and right lower quadrant cramping/discomfort associated with constipation alternating with diarrhea.  Cannot pinpoint triggers.  Does not awaken her at night.  No weight loss.  Denies any heartburn/reflux, nausea, vomiting or dysphagia.  Occasional bright red blood on wipe if she is straining.  No itching, burning or pain in rectum.  No recent lab work.  Recent ultrasound of the abdomen was normal.    Ultrasound of the abdomen 5/24 was normal.    No recent labs to review.      REVIEW OF SYSTEMS:    CONSTITUTIONAL: Denies any fever, chills, rigors, and weight loss.  HEENT: No earache or tinnitus. Denies hearing loss or visual disturbances.  CARDIOVASCULAR: No chest pain or palpitations.   RESPIRATORY: Denies any cough, hemoptysis, shortness of breath or dyspnea on exertion.  GASTROINTESTINAL: As noted in the History of Present Illness.   GENITOURINARY: No problems with urination. Denies any hematuria or dysuria.  NEUROLOGIC: No dizziness or vertigo, denies headaches.   MUSCULOSKELETAL: Denies any muscle or joint pain.   SKIN: Denies skin rashes or itching.   ENDOCRINE: Denies excessive thirst. Denies intolerance to heat or cold.  PSYCHOSOCIAL: Denies depression or anxiety. Denies any recent memory loss.       Historical Information   Past Medical History:   Diagnosis Date   • Abnormal Pap smear of cervix 07/2022 7/2022ASCUS with pos HPV-; [id number] neg, Biopsies negative   • Bilateral ocular hypertension    • Low back pain    • Migraine     w/ aura only when using combined hormonal contraception   • Sciatica     left sided     Past Surgical History:   Procedure Laterality Date   • LUMBAR EPIDURAL INJECTION      2 MAGDALENA     Social History   Social History     Substance and Sexual Activity   Alcohol Use Yes   • Alcohol/week: 4.0 standard drinks of alcohol   • Types: 4 Standard drinks or equivalent per week    Comment: occasionally     Social History     Substance  "and Sexual Activity   Drug Use Never   • Types: Marijuana, LSD, MDMA (ecstacy)    Comment: over 10 years ago     Social History     Tobacco Use   Smoking Status Former   • Current packs/day: 0.00   • Average packs/day: 0.5 packs/day for 12.0 years (6.0 ttl pk-yrs)   • Types: Cigarettes   • Start date: 9/1/2008   • Quit date: 9/1/2020   • Years since quitting: 3.8   Smokeless Tobacco Never     Family History   Problem Relation Age of Onset   • Hypertension Mother    • Arthritis Mother    • Hypothyroidism Mother    • Diabetes Father    • Diabetes type II Father    • Diabetes Maternal Uncle    • Diabetes type II Paternal Uncle    • Diabetes type II Brother    • Breast cancer Neg Hx    • Colon cancer Neg Hx    • Ovarian cancer Neg Hx    • Uterine cancer Neg Hx        Meds/Allergies       Current Outpatient Medications:   •  amitriptyline (ELAVIL) 10 mg tablet  •  buPROPion (WELLBUTRIN XL) 150 mg 24 hr tablet  •  dicyclomine (BENTYL) 10 mg capsule  •  magnesium 30 MG tablet  •  Nurtec 75 MG TBDP  •  RIBOFLAVIN PO  •  rizatriptan (MAXALT) 10 mg tablet    Allergies   Allergen Reactions   • Treenut [Nuts - Food Allergy] Swelling     Tree nuts           Objective     Blood pressure 114/81, pulse 66, temperature 98.4 °F (36.9 °C), temperature source Tympanic, height 5' 3\" (1.6 m), weight 82.8 kg (182 lb 9.6 oz), SpO2 98%. Body mass index is 32.35 kg/m².        PHYSICAL EXAM:      General Appearance:   Alert, cooperative, no distress   HEENT:   Normocephalic, atraumatic, anicteric.     Neck:  Supple, symmetrical, trachea midline   Lungs:   Clear to auscultation bilaterally; no rales, rhonchi or wheezing; respirations unlabored    Heart::   Regular rate and rhythm; no murmur, rub, or gallop.   Abdomen:   Soft, non-tender, non-distended; normal bowel sounds; no masses, no organomegaly    Genitalia:   Deferred    Rectal:   Deferred    Extremities:  No cyanosis, clubbing or edema    Pulses:  2+ and symmetric    Skin:  No jaundice, " "rashes, or lesions    Lymph nodes:  No palpable cervical lymphadenopathy        Lab Results:   No visits with results within 1 Day(s) from this visit.   Latest known visit with results is:   Office Visit on 02/20/2023   Component Date Value   • Glucose, Random 03/10/2023 92    • BUN 03/10/2023 13    • Creatinine 03/10/2023 0.95    • eGFR 03/10/2023 82    • SL AMB BUN/CREATININE RA* 03/10/2023 14    • Sodium 03/10/2023 140    • Potassium 03/10/2023 3.9    • Chloride 03/10/2023 103    • CO2 03/10/2023 22    • CALCIUM 03/10/2023 8.9    • Protein, Total 03/10/2023 7.2    • Albumin 03/10/2023 4.4    • Globulin, Total 03/10/2023 2.8    • Albumin/Globulin Ratio 03/10/2023 1.6    • TOTAL BILIRUBIN 03/10/2023 0.4    • Alk Phos Isoenzymes 03/10/2023 67    • AST 03/10/2023 22    • ALT 03/10/2023 20    • Glucose, Fasting 03/10/2023 91    • Glucose, 1/2 Hour 03/10/2023 CANCELED    • Glucose 1hr 03/10/2023 105    • Glucose, 1 1/2 hour 03/10/2023 CANCELED    • Glucose, 2 hour 03/10/2023 92    •  Glucose, 3 Hour 03/10/2023 CANCELED    • Glucose, 4 hour 03/10/2023 CANCELED    • Glucose, 5 hour 03/10/2023 CANCELED    • Glucose, 6 hour 03/10/2023 CANCELED    • Hemoglobin A1C 03/10/2023 5.5    • Estimated Average Glucose 03/10/2023 111    • Insulin, 1/2 hour 03/10/2023 14.2        Lab Results   Component Value Date    WBC 7.7 03/30/2022    HGB 13.6 03/30/2022    HCT 39.6 03/30/2022    MCV 88 03/30/2022     03/30/2022       Lab Results   Component Value Date    SODIUM 140 03/10/2023    K 3.9 03/10/2023     03/10/2023    CO2 22 03/10/2023    BUN 13 03/10/2023    CREATININE 0.95 03/10/2023    GLUC 92 03/10/2023    GLUC CANCELED 03/10/2023    GLUF 91 03/10/2023    AST 22 03/10/2023    ALT 20 03/10/2023    TP 7.2 03/10/2023    TBILI 0.4 03/10/2023    EGFR 82 03/10/2023       No results found for: \"CRP\"    Lab Results   Component Value Date    TSH 2.500 03/30/2022       No results found for: \"IRON\", \"TIBC\", " "\"FERRITIN\"    Radiology Results:   No results found.  "

## 2024-07-31 DIAGNOSIS — G43.909 MIGRAINE WITHOUT STATUS MIGRAINOSUS, NOT INTRACTABLE, UNSPECIFIED MIGRAINE TYPE: ICD-10-CM

## 2024-07-31 RX ORDER — RIZATRIPTAN BENZOATE 10 MG/1
TABLET ORAL
Qty: 10 TABLET | Refills: 2 | Status: SHIPPED | OUTPATIENT
Start: 2024-07-31

## 2024-08-09 ENCOUNTER — TELEPHONE (OUTPATIENT)
Age: 34
End: 2024-08-09

## 2024-08-09 NOTE — TELEPHONE ENCOUNTER
Contacted patient for Medication Management  to verify needs of services in attempts to offer patient an appointment at Available Office. Writer verified Full Name, , Callback Number, Address, and Insurance. Writer spoke with patient who stated that she is interested in services.    1st call attempt

## 2024-08-09 NOTE — TELEPHONE ENCOUNTER
"Behavioral Health Outpatient Intake Questions    Referred By   : Radha Butts PA-C    Please advise interviewee that they need to answer all questions truthfully to allow for best care, and any misrepresentations of information may affect their ability to be seen at this clinic   => Was this discussed? Yes     If Minor Child (under age 18)    Who is/are the legal guardian(s) of the child?     Is there a custody agreement?      If \"YES\"- Custody orders must be obtained prior to scheduling the first appointment  In addition, Consent to Treatment must be signed by all legal guardians prior to scheduling the first appointment    If \"NO\"- Consent to Treatment must be signed by all legal guardians prior to scheduling the first appointment    Behavioral Health Outpatient Intake History -     Presenting Problem (in patient's own words):     Pt stated has been treating anxiety and depression.  PCP has been treating symptoms.  Neurologist has been treating chronic migraines as well.    Are there any communication barriers for this patient?     No                                               If yes, please describe barriers:   If there is a unique situation, please refer to Arnaud Christianson/Nhi Azul for final determination.    Are you taking any psychiatric medications? Yes     If \"YES\" -What are they Wellbutrin XL, Nurtec, Maxalt     If \"YES\" -Who prescribes? Radha Butts PA-C, David Butts    Has the Patient previously received outpatient Talk Therapy or Medication Management from [facility]  No        If \"YES\"- When, Where and with Whom?         If \"NO\" -Has Patient received these services elsewhere?       If \"YES\" -When, Where, and with Whom?    Has the Patient abused alcohol or other substances in the last 6 months ? No  No concerns of substance abuse are reported.     If \"YES\" -What substance, How much, How often?     If illegal substance: Refer to [facility] (for SYDNEE) or [facility] Offices.   If Alcohol in " "excess of 10 drinks per week:  Refer to [facility] (for SYDNEE) or [facility] Offices    Legal History-     Is this treatment court ordered? No   If \"yes \"send to :  Talk Therapy : Send to Arnaud Christianson/Nhi Azul for final determination   Med Management: Send to Dr Mancini for final determination     Has the Patient been convicted of a felony?  No   If \"Yes\" send to -When, What?  Talk Therapy: Send to Arnaud Azul for final determination   Med Management: Send to Dr Mancini for final determination     ACCEPTED as a patient Yes  If \"Yes\" Appointment Date:   Magdiel Kirby 9/16 @ 130PM & 10/14@ 3PM    Referred Elsewhere? No  If “Yes” - (Where? Ex: [facility], [facility], [facility], [facility], etc.)       Name of Insurance Co:  [employer]  Insurance ID#  [id number]   Insurance Phone #  If ins is primary or secondary? PRIMARY  If patient is a minor, parents information such as Name, D.O.B of guarantor.  "

## 2024-08-12 ENCOUNTER — TELEPHONE (OUTPATIENT)
Dept: PSYCHIATRY | Facility: CLINIC | Age: 34
End: 2024-08-12

## 2024-08-12 NOTE — TELEPHONE ENCOUNTER
Attempted to call patient but no answer and no voicemail.    Virtual f/u with Magdiel Kirby on 10/14 needs to be rescheduled due to provider being out of office that day.     Please call back at [phone].     Please note new patient appointment on 9/16 at 1:30pm is staying the same.

## 2024-08-16 ENCOUNTER — TELEPHONE (OUTPATIENT)
Dept: PSYCHIATRY | Facility: CLINIC | Age: 34
End: 2024-08-16

## 2024-08-16 NOTE — TELEPHONE ENCOUNTER
One week follow up call for New Patient appointment with   Magdiel Kirby PA-C   on 09/16/2024 was made on 08/16/2024. Writer informed patient of New Patient paperwork needing to be completed 5 days prior to the appointment. Writer confirmed paperwork has been sent via [employer].    Appointment was made on: 08/09/2024

## 2024-08-20 ENCOUNTER — OFFICE VISIT (OUTPATIENT)
Dept: FAMILY MEDICINE CLINIC | Facility: CLINIC | Age: 34
End: 2024-08-20
Payer: COMMERCIAL

## 2024-08-20 VITALS
HEART RATE: 66 BPM | WEIGHT: 189 LBS | SYSTOLIC BLOOD PRESSURE: 118 MMHG | DIASTOLIC BLOOD PRESSURE: 88 MMHG | HEIGHT: 63 IN | BODY MASS INDEX: 33.49 KG/M2 | RESPIRATION RATE: 18 BRPM | OXYGEN SATURATION: 99 %

## 2024-08-20 DIAGNOSIS — K58.2 IRRITABLE BOWEL SYNDROME WITH BOTH CONSTIPATION AND DIARRHEA: ICD-10-CM

## 2024-08-20 DIAGNOSIS — F41.1 ANXIETY, GENERALIZED: Primary | ICD-10-CM

## 2024-08-20 DIAGNOSIS — G43.909 MIGRAINE WITHOUT STATUS MIGRAINOSUS, NOT INTRACTABLE, UNSPECIFIED MIGRAINE TYPE: ICD-10-CM

## 2024-08-20 DIAGNOSIS — F33.9 DEPRESSION, RECURRENT (HCC): ICD-10-CM

## 2024-08-20 PROCEDURE — 99213 OFFICE O/P EST LOW 20 MIN: CPT | Performed by: PHYSICIAN ASSISTANT

## 2024-08-20 NOTE — PATIENT INSTRUCTIONS
1. Anxiety, generalized  Assessment & Plan:  Anxiety in general very well-controlled.  2. Depression, recurrent (HCC)  Assessment & Plan:  Very well-controlled without SI or HI.  Patient at this time is wishing to stop her Wellbutrin.  She may try to go every other day and will message me to let me know when she is completely off of it.  We did discuss the fact that this medication may be the reason why she is feeling that she does not need anything for her mood and if this turns out to be the case after she stopped the medication for at least 1 month we can easily just restart if needed.  3. Irritable bowel syndrome with both constipation and diarrhea  Assessment & Plan:  Patient is seeing GI she does have follow-up appointment has not tried the Bentyl yet that they prescribed for her as needed.  4. Migraine without status migrainosus, not intractable, unspecified migraine type  Assessment & Plan:  Patient did see neurology they gave her Elavil to take nightly for prevention however she does not want to take this and does want to discuss that with them at her follow-up appointment she does have Nurtec that she takes and if this does not seem to work she can take a Maxalt the same day.

## 2024-08-20 NOTE — ASSESSMENT & PLAN NOTE
Patient did see neurology they gave her Elavil to take nightly for prevention however she does not want to take this and does want to discuss that with them at her follow-up appointment she does have Nurtec that she takes and if this does not seem to work she can take a Maxalt the same day.

## 2024-08-20 NOTE — PROGRESS NOTES
Ambulatory Visit  Name: Renae Mancia      : 1990      MRN: [id number]  Encounter Provider: Radha Butts PA-C  Encounter Date: 2024   Encounter department: [facility]    Assessment & Plan   1. Anxiety, generalized  Assessment & Plan:  Anxiety in general very well-controlled.  2. Depression, recurrent (HCC)  Assessment & Plan:  Very well-controlled without SI or HI.  Patient at this time is wishing to stop her Wellbutrin.  She may try to go every other day and will message me to let me know when she is completely off of it.  We did discuss the fact that this medication may be the reason why she is feeling that she does not need anything for her mood and if this turns out to be the case after she stopped the medication for at least 1 month we can easily just restart if needed.  3. Irritable bowel syndrome with both constipation and diarrhea  Assessment & Plan:  Patient is seeing GI she does have follow-up appointment has not tried the Bentyl yet that they prescribed for her as needed.  4. Migraine without status migrainosus, not intractable, unspecified migraine type  Assessment & Plan:  Patient did see neurology they gave her Elavil to take nightly for prevention however she does not want to take this and does want to discuss that with them at her follow-up appointment she does have Nurtec that she takes and if this does not seem to work she can take a Maxalt the same day.       History of Present Illness     PT here today to discuss how all the changes to her mood meds are going. At last apt we increased her wellbutrin to 300 from 150 and then she could not tolerate so went back down to 150. Later pt messaged me again and asked to stop and wean off lexapro.     At this point in time patient does have an appointment with her first psychiatry appointment next month however patient is now tells me that she feels she is doing really well and she actually wants to try stopping the  "Wellbutrin 150.  We did discuss the possibility of her feeling good because of the medication.    Patient did see neurology she is on Nurtec and then if that onset this does not help she is allowed to take Maxalt.  They did give her Elavil but patient did not like the side effect profile and did not start and wants it off her med list.    Also patient was given Bentyl by GI for her irritable bowel and she has not tried this either for her severe cramping with her more constipated form of IBS.        Review of Systems   Constitutional: Negative.    HENT: Negative.     Eyes: Negative.    Respiratory: Negative.     Cardiovascular: Negative.    Gastrointestinal: Negative.    Endocrine: Negative.    Genitourinary: Negative.    Musculoskeletal: Negative.    Skin: Negative.    Allergic/Immunologic: Negative.    Neurological: Negative.    Hematological: Negative.    Psychiatric/Behavioral: Negative.         Objective     /88 (BP Location: Right arm, Patient Position: Sitting, Cuff Size: Large)   Pulse 66   Resp 18   Ht 5' 3\" (1.6 m)   Wt 85.7 kg (189 lb)   SpO2 99%   BMI 33.48 kg/m²     Physical Exam  Vitals and nursing note reviewed.   Constitutional:       Appearance: Normal appearance. She is well-developed.   HENT:      Head: Normocephalic and atraumatic.   Eyes:      General: Lids are normal.      Conjunctiva/sclera: Conjunctivae normal.      Pupils: Pupils are equal, round, and reactive to light.   Cardiovascular:      Rate and Rhythm: Normal rate and regular rhythm.      Heart sounds: No murmur heard.  Pulmonary:      Effort: Pulmonary effort is normal.      Breath sounds: Normal breath sounds.   Skin:     General: Skin is warm and dry.   Neurological:      General: No focal deficit present.      Mental Status: She is alert.      Coordination: Coordination is intact.   Psychiatric:         Mood and Affect: Mood normal.         Behavior: Behavior normal. Behavior is cooperative.         Thought Content: " Thought content normal.         Judgment: Judgment normal.       Administrative Statements

## 2024-08-20 NOTE — ASSESSMENT & PLAN NOTE
Very well-controlled without SI or HI.  Patient at this time is wishing to stop her Wellbutrin.  She may try to go every other day and will message me to let me know when she is completely off of it.  We did discuss the fact that this medication may be the reason why she is feeling that she does not need anything for her mood and if this turns out to be the case after she stopped the medication for at least 1 month we can easily just restart if needed.

## 2024-08-20 NOTE — ASSESSMENT & PLAN NOTE
Patient is seeing GI she does have follow-up appointment has not tried the Bentyl yet that they prescribed for her as needed.

## 2024-09-16 ENCOUNTER — OFFICE VISIT (OUTPATIENT)
Dept: PSYCHIATRY | Facility: CLINIC | Age: 34
End: 2024-09-16
Payer: COMMERCIAL

## 2024-09-16 DIAGNOSIS — F33.1 MAJOR DEPRESSIVE DISORDER, RECURRENT, MODERATE (HCC): ICD-10-CM

## 2024-09-16 DIAGNOSIS — F41.1 GAD (GENERALIZED ANXIETY DISORDER): Primary | ICD-10-CM

## 2024-09-16 PROCEDURE — 90792 PSYCH DIAG EVAL W/MED SRVCS: CPT | Performed by: PHYSICIAN ASSISTANT

## 2024-09-16 NOTE — PSYCH
PSYCHIATRIC EVALUATION     [facility]   Visit Time    Visit Start Time: 1320  Visit Stop Time: 1400  Total Visit Duration:  40 minutes    Reason for visit:   Chief Complaint   Patient presents with    Establish Care    Medication Management    Anxiety    Depression    Panic Attack       CLEVELAND Macdonald is a 34 y.o. female with a history of Anxiety, Depression, and panic attacks  who presents for psychiatric evaluation due to worsening symptoms over the past year.  Patient states feels she has suffered from anxiety and depression throughout her life however over the past year symptoms have seemed to have worsened with more irritability which has been noticed by her .  Patient stating lots of the symptoms started last year when purchasing a home which was a significant stressor.  Reports frequent panic attacks typically twice a week.  Also states has been having more frequent arguments with her  as well as increased stress at work.  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  Patient denies any history of SI or thoughts to hurt herself or others.  Has been going through online psychotherapy for approximately 1 year and feels there is definitely a benefit with therapy and her symptoms are gradually improving.  Patient also was started on Wellbutrin by her PCP at 150 mg daily and states has noticed improvement with the medication as well.  Patient states in the past was on Lexapro up to 10 mg but felt it was somewhat too activating and was worsening anxiety symptoms.  Patient denies any psychiatric inpatient admissions.  Denies any history of substance abuse.  Rare alcohol consumption and does not smoke.  Family history of brother with alcohol abuse.  Patient states has good support system with her .  Does not have any children.  Reports that her childhood was mostly good but parents were not very overly supportive.  Denies  any history of legal troubles,  history or trauma.  Denies any physical or sexual abuse.  Denies any head injuries, seizures or concussions.  Patient does not exhibit any signs and symptoms of laura, paranoia, delusional thinking.  Does not seem internally preoccupied.  Denies any auditory or visual hallucinations..     PHQ-2/9 Depression Screening    Little interest or pleasure in doing things: 0 - not at all  Feeling down, depressed, or hopeless: 0 - not at all  Trouble falling or staying asleep, or sleeping too much: 0 - not at all  Feeling tired or having little energy: 0 - not at all  Poor appetite or overeatin - several days  Feeling bad about yourself - or that you are a failure or have let yourself or your family down: 1 - several days  Trouble concentrating on things, such as reading the newspaper or watching television: 3 - nearly every day  Moving or speaking so slowly that other people could have noticed. Or the opposite - being so fidgety or restless that you have been moving around a lot more than usual: 1 - several days  Thoughts that you would be better off dead, or of hurting yourself in some way: 0 - not at all  PHQ-9 Score: 6  PHQ-9 Interpretation: Mild depression        LEONARD-7 Flowsheet Screening      Flowsheet Row Most Recent Value   Over the last two weeks, how often have you been bothered by the following problems?     Feeling nervous, anxious, or on edge 2    Not being able to stop or control worrying 1    Worrying too much about different things 2    Trouble relaxing  0    Being so restless that it's hard to sit still 0    Becoming easily annoyed or irritable  2    Feeling afraid as if something awful might happen 1    How difficult have these problems made it for you to do your work, take care of things at home, or get along with other people?  Somewhat difficult    LEONARD Score  8              Review Of Systems:     Mood Anxiety   Behavior Normal    Thought Content Normal   General  Normal    Personality Normal   Other Psych Symptoms Normal   Constitutional Negative   ENT Negative   Cardiovascular Negative   Respiratory Negative   Gastrointestinal Negative   Genitourinary Negative   Musculoskeletal Negative   Integumentary Negative   Neurological Negative   Endocrine Normal    Other Symptoms Normal        Past Psychiatric History:      Past Inpatient Psychiatric Treatment:   None   Past Outpatient Psychiatric Treatment:    Online  Psychotherapy x 1 year  Past Suicide Attempts:    no  Past Violent Behavior:    no  Past Psychiatric Medication Trials:    Lexapro and Wellbutrin    Family Psychiatric History:   Family History   Problem Relation Age of Onset    Hypertension Mother     Arthritis Mother     Hypothyroidism Mother     Diabetes Father     Diabetes type II Father     Diabetes Maternal Uncle     Diabetes type II Paternal Uncle     Diabetes type II Brother     Breast cancer Neg Hx     Colon cancer Neg Hx     Ovarian cancer Neg Hx     Uterine cancer Neg Hx        Social History:    Education: college graduate  Learning Disabilities: No  Marital history:   Living arrangement, social support: Lives with   Occupational History: Works for nonprofit  Functioning Relationships: good support system and good relationship with spouse or significant other.  Other Pertinent History: None     Social History     Substance and Sexual Activity   Drug Use Never    Types: Marijuana, LSD, MDMA (ecstacy)    Comment: over 10 years ago       Traumatic History:       Abuse: Denies  Other Traumatic Events: Denies    The following portions of the patient's history were reviewed and updated as appropriate: allergies, current medications, past family history, past medical history, past social history, past surgical history, and problem list.     Social History     Socioeconomic History    Marital status: Single     Spouse name: Not on file    Number of children: Not on file    Years of education: Not on  file    Highest education level: Not on file   Occupational History    Not on file   Tobacco Use    Smoking status: Former     Current packs/day: 0.00     Average packs/day: 0.5 packs/day for 12.0 years (6.0 ttl pk-yrs)     Types: Cigarettes     Start date: 2008     Quit date: 2020     Years since quittin.0    Smokeless tobacco: Never   Vaping Use    Vaping status: Former    Substances: Nicotine   Substance and Sexual Activity    Alcohol use: Yes     Alcohol/week: 4.0 standard drinks of alcohol     Types: 4 Standard drinks or equivalent per week     Comment: occasionally    Drug use: Never     Types: Marijuana, LSD, MDMA (ecstacy)     Comment: over 10 years ago    Sexual activity: Yes     Partners: Male     Birth control/protection: Coitus interruptus     Comment: Nexplanon removed on 21   Other Topics Concern    Not on file   Social History Narrative    Not on file     Social Determinants of Health     Financial Resource Strain: Not on file   Food Insecurity: Not on file   Transportation Needs: Not on file   Physical Activity: Not on file   Stress: Not on file   Social Connections: Not on file   Intimate Partner Violence: Not on file   Housing Stability: Not on file     Social History     Social History Narrative    Not on file       Mental status:  Appearance calm and cooperative , adequate hygiene and grooming, and good eye contact    Mood Mild anxiety   Affect affect appropriate    Speech Normal speech   Thought Processes normal thought processes   Hallucinations no hallucinations present    Thought Content no delusions   Abnormal Thoughts no suicidal thoughts  and no homicidal thoughts    Orientation  oriented to person and place and time   Remote Memory short term memory intact and long term memory intact   Attention Span concentration intact   Intellect Appears to be Above Average Intelligence   Insight Insight intact   Judgement judgment was intact   Muscle Strength Muscle strength and tone  were normal   Language no difficulty naming common objects, no difficulty repeating a phrase , and no difficulty writing a sentence    Fund of Knowledge displays adequate knowledge of current events, adequate fund of knowledge regarding past history, and adequate fund of knowledge regarding vocabulary    Pain none   Pain Scale 0       Laboratory Results: Pertinent lab work and studies reviewed with patient.    Assessment/Plan:   Treatment options discussed with patient such as continuing her Wellbutrin at current dose of 150 mg or titrating up to 300 mg daily.  Also discussed use of SSRIs and SNRIs for treatment of her anxiety and depression.  Did discuss Atarax for panic attacks.  Patient feels overall that her symptoms have significantly improved with her online therapist and her current dose of 150 mg daily of Wellbutrin.  Patient at this time does not wish for any medication adjustment and states here to establish care just in case she has worsening symptoms and that she would have someone to contact if the symptoms did worsen.  Patient however will follow-up in 1 month or sooner if needed.  Patient instructed to call with any questions or concerns.  Patient will continue her on line psychotherapy treatments.       Diagnoses and all orders for this visit:    LEONARD (generalized anxiety disorder)    Major depressive disorder, recurrent, moderate (HCC)         Treatment Recommendations- Risks Benefits      Immediate Medical/Psychiatric/Psychotherapy Treatments and Any Precautions:     Risks, Benefits And Possible Side Effects Of Medications:  Risks, benefits, and possible side effects of medications explained to patient and patient verbalizes understanding and Risks of medications explained if female patient. Patient verbalizes understanding and agrees to notify her doctor if she becomes pregnant    Controlled Medication Discussion: No records found for controlled prescriptions according to [place] Prescription  Drug Monitoring Program.     This note was not shared with the patient due to this is a psychotherapy note

## 2024-09-17 DIAGNOSIS — G43.109 MIGRAINE WITH AURA AND WITHOUT STATUS MIGRAINOSUS, NOT INTRACTABLE: ICD-10-CM

## 2024-09-17 PROBLEM — F33.1 MAJOR DEPRESSIVE DISORDER, RECURRENT, MODERATE (HCC): Status: ACTIVE | Noted: 2024-09-17

## 2024-09-18 RX ORDER — RIMEGEPANT SULFATE 75 MG/75MG
TABLET, ORALLY DISINTEGRATING ORAL
Qty: 16 TABLET | Refills: 2 | Status: SHIPPED | OUTPATIENT
Start: 2024-09-18

## 2024-09-18 NOTE — TELEPHONE ENCOUNTER
Medication: Nurtec     Dose/Frequency: 75 MG TBDP, TAKE ONE TABLET BY MOUTH AS NEEDED FOR MIGRAINE (MAX 1 TAB/24HR    Quantity: 16 tablets    Pharmacy: [facility] - [address]     Office:   [] PCP/Provider -   [x] Speciality/Provider - NeurologyDavid PA-C    Does the patient have enough for 3 days?   [x] Yes   [] No - Send as HP to POD     David: medication pended, please sign if agreeable

## 2024-09-20 DIAGNOSIS — F41.1 ANXIETY, GENERALIZED: ICD-10-CM

## 2024-09-20 DIAGNOSIS — F33.9 DEPRESSION, RECURRENT (HCC): ICD-10-CM

## 2024-09-20 RX ORDER — BUPROPION HYDROCHLORIDE 150 MG/1
150 TABLET ORAL EVERY MORNING
Qty: 30 TABLET | Refills: 11 | Status: SHIPPED | OUTPATIENT
Start: 2024-09-20

## 2024-10-28 ENCOUNTER — TELEMEDICINE (OUTPATIENT)
Dept: PSYCHIATRY | Facility: CLINIC | Age: 34
End: 2024-10-28
Payer: COMMERCIAL

## 2024-10-28 DIAGNOSIS — F33.1 MAJOR DEPRESSIVE DISORDER, RECURRENT, MODERATE (HCC): Primary | ICD-10-CM

## 2024-10-28 DIAGNOSIS — F41.1 GAD (GENERALIZED ANXIETY DISORDER): ICD-10-CM

## 2024-10-28 PROCEDURE — 99442 PR PHYS/QHP TELEPHONE EVALUATION 11-20 MIN: CPT | Performed by: PHYSICIAN ASSISTANT

## 2024-10-28 NOTE — PSYCH
Telemedicine consent    Patient: Renae Mancia  Provider: Magdiel Kirby PA-C  Provider located at  [facility] [place] [address] [facility] [place] [facility] [place] [address] [phone]    The patient was identified by name and date of birth. Renae Mancia was informed that this is a telemedicine visit and that the visit is being conducted through Telephone.  My office door was closed. No one else was in the room.  She acknowledged consent and understanding of privacy and security of the video platform. The patient has agreed to participate and understands they can discontinue the visit at any time.    Patient had no access to video and visit was completed on telephone.    Patient is aware this is a billable service.     I spent 15 minutes with the patient during this visit.     Assessment & Plan  Major depressive disorder, recurrent, moderate (HCC)  Continue Wellbutrin at 150 XL mg daily.  Patient does not want any medication changes at this time.  Continue psychotherapy  Contracts for safety and denies any SI  Follow-up in 4 months or sooner if needed  Call with any questions or concerns       LEONARD (generalized anxiety disorder)  Continue Wellbutrin at 150 XL mg daily.             Visit Time    Visit Start Time: 1520  Visit Stop Time: 1535  Total Visit Duration:  15 minutes    Subjective:     Patient ID: Renae Mancia is a 34 y.o. female today for [facility] medication management and psychiatric eval.  Patient reporting that her anxiety and depression symptoms seem controlled and remained stable on her current psychotropic medication consisting of Wellbutrin  mg once daily.  Patient denies any SI or HI.  States that she does not feel it is necessary at this time to change the doses of her Wellbutrin.  Patient would like to spread out follow-up appointments and is requesting follow-up appointment in approximately 4 months.  Patient has no other concerns at this  time.    HPI ROS Appetite Changes and Sleep: normal appetite, normal energy level, no weight change, and normal number of sleep hours    Review Of Systems:     Mood Normal   Behavior Normal    Thought Content Normal   General Normal    Personality Normal   Other Psych Symptoms Normal   Constitutional Normal   ENT Normal   Cardiovascular Normal    Respiratory Normal    Gastrointestinal Normal   Genitourinary Normal    Musculoskeletal Negative   Integumentary Negative   Neurological Negative   Endocrine Normal    Other Symptoms Normal        Laboratory Results: Lab work from April and March 2023 reviewed.  Patient has lab work pending that was ordered on July 18, 2024.    Substance Abuse History:  Social History     Substance and Sexual Activity   Drug Use Never    Types: Marijuana, LSD, MDMA (ecstacy)    Comment: over 10 years ago       Family Psychiatric History:   Family History   Problem Relation Age of Onset    Hypertension Mother     Arthritis Mother     Hypothyroidism Mother     Diabetes Father     Diabetes type II Father     Diabetes Maternal Uncle     Diabetes type II Paternal Uncle     Diabetes type II Brother     Breast cancer Neg Hx     Colon cancer Neg Hx     Ovarian cancer Neg Hx     Uterine cancer Neg Hx        The following portions of the patient's history were reviewed and updated as appropriate: allergies, current medications, past family history, past medical history, past social history, past surgical history, and problem list.    Social History     Socioeconomic History    Marital status: Single     Spouse name: Not on file    Number of children: Not on file    Years of education: Not on file    Highest education level: Not on file   Occupational History    Not on file   Tobacco Use    Smoking status: Former     Current packs/day: 0.00     Average packs/day: 0.5 packs/day for 12.0 years (6.0 ttl pk-yrs)     Types: Cigarettes     Start date: 9/1/2008     Quit date: 9/1/2020     Years since  quittin.1    Smokeless tobacco: Never   Vaping Use    Vaping status: Former    Substances: Nicotine   Substance and Sexual Activity    Alcohol use: Yes     Alcohol/week: 4.0 standard drinks of alcohol     Types: 4 Standard drinks or equivalent per week     Comment: occasionally    Drug use: Never     Types: Marijuana, LSD, MDMA (ecstacy)     Comment: over 10 years ago    Sexual activity: Yes     Partners: Male     Birth control/protection: Coitus interruptus     Comment: Nexplanon removed on 21   Other Topics Concern    Not on file   Social History Narrative    Not on file     Social Determinants of Health     Financial Resource Strain: Not on file   Food Insecurity: Not on file   Transportation Needs: Not on file   Physical Activity: Not on file   Stress: Not on file   Social Connections: Not on file   Intimate Partner Violence: Not on file   Housing Stability: Not on file     Social History     Social History Narrative    Not on file       Objective:       Mental status:  Appearance calm and cooperative    Mood euthymic   Affect affect appropriate    Speech Normal rate and volume   Thought Processes normal thought processes   Hallucinations no hallucinations present    Thought Content no delusions   Abnormal Thoughts no suicidal thoughts  and no homicidal thoughts    Orientation  oriented to person and place and time   Remote Memory short term memory intact and long term memory intact   Attention Span concentration intact   Intellect Appears to be of Average Intelligence   Insight Insight intact   Judgement judgment was intact   Muscle Strength Virtual visit patient reports normal   Language no difficulty repeating a phrase    Fund of Knowledge displays adequate knowledge of current events, adequate fund of knowledge regarding past history, and adequate fund of knowledge regarding vocabulary    Pain none   Pain Scale 0       Assessment/Plan:       There are no diagnoses linked to this encounter.         Treatment Recommendations- Risks Benefits      Immediate Medical/Psychiatric/Psychotherapy Treatments and Any Precautions:     Risks, Benefits And Possible Side Effects Of Medications nataly with patient and patient agreeable plan.  Controlled Medication Discussion: No records found for controlled prescriptions according to [place] Prescription Drug Monitoring Program.      This note was not shared with the patient due to this is a psychotherapy note

## 2024-10-29 NOTE — ASSESSMENT & PLAN NOTE
Continue Wellbutrin at 150 XL mg daily.  Patient does not want any medication changes at this time.  Continue psychotherapy  Contracts for safety and denies any SI  Follow-up in 4 months or sooner if needed  Call with any questions or concerns

## 2024-10-31 ENCOUNTER — OFFICE VISIT (OUTPATIENT)
Dept: PAIN MEDICINE | Facility: CLINIC | Age: 34
End: 2024-10-31
Payer: COMMERCIAL

## 2024-10-31 VITALS
HEIGHT: 63 IN | RESPIRATION RATE: 18 BRPM | SYSTOLIC BLOOD PRESSURE: 106 MMHG | HEART RATE: 78 BPM | BODY MASS INDEX: 33.49 KG/M2 | WEIGHT: 189 LBS | DIASTOLIC BLOOD PRESSURE: 78 MMHG

## 2024-10-31 DIAGNOSIS — M53.86 SCIATICA OF LEFT SIDE ASSOCIATED WITH DISORDER OF LUMBAR SPINE: ICD-10-CM

## 2024-10-31 DIAGNOSIS — M51.371 DEGENERATION OF INTERVERTEBRAL DISC OF LUMBOSACRAL REGION WITH LOWER EXTREMITY PAIN: Primary | ICD-10-CM

## 2024-10-31 PROCEDURE — 99214 OFFICE O/P EST MOD 30 MIN: CPT | Performed by: PHYSICAL MEDICINE & REHABILITATION

## 2024-10-31 NOTE — PROGRESS NOTES
Assessment:  1. Degeneration of intervertebral disc of lumbosacral region with lower extremity pain    2. Sciatica of left side associated with disorder of lumbar spine        Plan:  Ms. Mancia is a pleasant 34-year-old female significant past medical history of IBS, GERD, major depressive disorder presents to [facility] for follow-up and reevaluation regarding ongoing low back pain.  We previously performed bilateral L4-L5 TFESI in February 2024 which did provide approximately 7 months of significant greater than 50% relief in her pain and a gradual return of symptoms.  Given significant relief and gradual return of pain would proceed with repeat bilateral L4-L5 TFESI under fluoroscopy guidance.  An order has been placed and patient agrees.    Complete risks and benefits including bleeding, infection, tissue reaction, nerve injury and allergic reaction were discussed. The approach was demonstrated using models and literature was provided. Verbal and written consent was obtained.    My impressions and treatment recommendations were discussed in detail with the patient who verbalized understanding and had no further questions.  Discharge instructions were provided. I personally saw and examined the patient and I agree with the above discussed plan of care.    Orders Placed This Encounter   Procedures    [place] spine and pain procedure     Standing Status:   Future     Standing Expiration Date:   10/31/2028     Order Specific Question:   Reason for Exam:     Answer:   Bilateral L4-L5 TFESI     Order Specific Question:   Is the patient pregnant?     Answer:   No     Order Specific Question:   Anticoagulant hold needed?     Answer:   No     No orders of the defined types were placed in this encounter.      History of Present Illness:  Renae Mancia is a 34 y.o. female who presents for a follow up office visit in regards to Back Pain, Hip Pain (Left), and Leg Pain (LLE).   The patient’s current  symptoms include low back pain with rating symptoms into the left lower extremity currently rated 4-6 out of 10 and described as a constant numbness, burning, dull aching, sharp, throbbing, pins needles sensation that is worse in the morning in the evening.  Presents today for follow-up and reevaluation.    I have personally reviewed and/or updated the patient's past medical history, past surgical history, family history, social history, current medications, allergies, and vital signs today.     Review of Systems   Respiratory:  Negative for shortness of breath.    Cardiovascular:  Negative for chest pain.   Gastrointestinal:  Negative for constipation, diarrhea, nausea and vomiting.   Musculoskeletal:  Positive for back pain, gait problem and joint swelling. Negative for arthralgias and myalgias.        LLE Pain  Left Hip Pain   Skin:  Negative for rash.   Neurological:  Negative for dizziness, seizures and weakness.   All other systems reviewed and are negative.      Patient Active Problem List   Diagnosis    Migraine headache    DDD (degenerative disc disease), lumbosacral    Intervertebral disc disorder with radiculopathy of lumbar region    Irritable bowel syndrome with both constipation and diarrhea    LEONARD (generalized anxiety disorder)    Pelvic pain in female    Sciatica of left side associated with disorder of lumbar spine    History of tobacco use    SOB (shortness of breath)    Depression, recurrent (HCC)    Diarrhea    Gastroesophageal reflux disease without esophagitis    Hypoglycemia    Fatigue    Snoring    Right upper quadrant abdominal pain    Major depressive disorder, recurrent, moderate (HCC)       Past Medical History:   Diagnosis Date    Abnormal Pap smear of cervix 07/2022 7/2022ASCUS with pos HPV-; [id number] neg, Biopsies negative    Bilateral ocular hypertension     Low back pain     Migraine     w/ aura only when using combined hormonal contraception    Sciatica     left sided        Past Surgical History:   Procedure Laterality Date    LUMBAR EPIDURAL INJECTION      2 MAGDALENA       Family History   Problem Relation Age of Onset    Hypertension Mother     Arthritis Mother     Hypothyroidism Mother     Diabetes Father     Diabetes type II Father     Diabetes Maternal Uncle     Diabetes type II Paternal Uncle     Diabetes type II Brother     Breast cancer Neg Hx     Colon cancer Neg Hx     Ovarian cancer Neg Hx     Uterine cancer Neg Hx        Social History     Occupational History    Not on file   Tobacco Use    Smoking status: Former     Current packs/day: 0.00     Average packs/day: 0.5 packs/day for 12.0 years (6.0 ttl pk-yrs)     Types: Cigarettes     Start date: 2008     Quit date: 2020     Years since quittin.1    Smokeless tobacco: Never   Vaping Use    Vaping status: Former    Substances: Nicotine   Substance and Sexual Activity    Alcohol use: Yes     Alcohol/week: 4.0 standard drinks of alcohol     Types: 4 Standard drinks or equivalent per week     Comment: occasionally    Drug use: Never     Types: Marijuana, LSD, MDMA (ecstacy)     Comment: over 10 years ago    Sexual activity: Yes     Partners: Male     Birth control/protection: Coitus interruptus     Comment: Nexplanon removed on 21       Current Outpatient Medications on File Prior to Visit   Medication Sig    buPROPion (WELLBUTRIN XL) 150 mg 24 hr tablet TAKE ONE TABLET BY MOUTH ONCE DAILY IN THE MORNING    dicyclomine (BENTYL) 10 mg capsule Take 1 capsule (10 mg total) by mouth 4 (four) times a day (before meals and at bedtime)    magnesium 30 MG tablet Take 30 mg by mouth 2 (two) times a day Pt unaware of dosage    Nurtec 75 MG TBDP TAKE ONE TABLET BY MOUTH AS NEEDED FOR MIGRAINE (MAX 1 TAB/24HR)    RIBOFLAVIN PO Take 1 tablet by mouth daily    rizatriptan (MAXALT) 10 mg tablet TAKE ONE TABLET BY MOUTH AS NEEDED FOR MIGRAINE FOR 1 DOSE. MAY REPEAT IN 2 HOURS IF NO RELIEF     No current facility-administered  "medications on file prior to visit.       Allergies   Allergen Reactions    Treenut [Nuts - Food Allergy] Swelling     Tree nuts       Physical Exam:    /78   Pulse 78   Resp 18   Ht 5' 3\" (1.6 m)   Wt 85.7 kg (189 lb)   BMI 33.48 kg/m²     Constitutional:normal, well developed, well nourished, alert, in no distress and non-toxic and no overt pain behavior.  Eyes:anicteric  HEENT:grossly intact  Neck:supple, symmetric, trachea midline and no masses   Pulmonary:even and unlabored  Cardiovascular:No edema or pitting edema present  Skin:Normal without rashes or lesions and well hydrated  Psychiatric:Mood and affect appropriate  Neurologic:Cranial Nerves II-XII grossly intact  Musculoskeletal:normal    Imaging    "

## 2024-11-04 ENCOUNTER — TELEPHONE (OUTPATIENT)
Dept: PSYCHIATRY | Facility: CLINIC | Age: 34
End: 2024-11-04

## 2024-11-11 DIAGNOSIS — G43.909 MIGRAINE WITHOUT STATUS MIGRAINOSUS, NOT INTRACTABLE, UNSPECIFIED MIGRAINE TYPE: ICD-10-CM

## 2024-11-11 DIAGNOSIS — G43.109 MIGRAINE WITH AURA AND WITHOUT STATUS MIGRAINOSUS, NOT INTRACTABLE: ICD-10-CM

## 2024-11-11 RX ORDER — RIMEGEPANT SULFATE 75 MG/75MG
TABLET, ORALLY DISINTEGRATING ORAL
Qty: 16 TABLET | Refills: 2 | Status: SHIPPED | OUTPATIENT
Start: 2024-11-11

## 2024-11-12 RX ORDER — RIZATRIPTAN BENZOATE 10 MG/1
TABLET ORAL
Qty: 10 TABLET | Refills: 2 | Status: SHIPPED | OUTPATIENT
Start: 2024-11-12

## 2024-12-09 ENCOUNTER — HOSPITAL ENCOUNTER (OUTPATIENT)
Dept: RADIOLOGY | Facility: CLINIC | Age: 34
Discharge: HOME | End: 2024-12-09
Payer: COMMERCIAL

## 2024-12-09 VITALS
SYSTOLIC BLOOD PRESSURE: 123 MMHG | TEMPERATURE: 98.4 F | OXYGEN SATURATION: 98 % | RESPIRATION RATE: 20 BRPM | HEART RATE: 73 BPM | DIASTOLIC BLOOD PRESSURE: 85 MMHG

## 2024-12-09 DIAGNOSIS — M53.86 SCIATICA OF LEFT SIDE ASSOCIATED WITH DISORDER OF LUMBAR SPINE: ICD-10-CM

## 2024-12-09 DIAGNOSIS — M51.371 DEGENERATION OF INTERVERTEBRAL DISC OF LUMBOSACRAL REGION WITH LOWER EXTREMITY PAIN: ICD-10-CM

## 2024-12-09 PROCEDURE — 64483 NJX AA&/STRD TFRM EPI L/S 1: CPT | Performed by: PHYSICAL MEDICINE & REHABILITATION

## 2024-12-09 RX ORDER — LIDOCAINE HYDROCHLORIDE 10 MG/ML
4 INJECTION, SOLUTION EPIDURAL; INFILTRATION; INTRACAUDAL; PERINEURAL ONCE
Status: COMPLETED | OUTPATIENT
Start: 2024-12-09 | End: 2024-12-09

## 2024-12-09 RX ORDER — BUPIVACAINE HCL/PF 2.5 MG/ML
2 VIAL (ML) INJECTION ONCE
Status: COMPLETED | OUTPATIENT
Start: 2024-12-09 | End: 2024-12-09

## 2024-12-09 RX ORDER — METHYLPREDNISOLONE ACETATE 40 MG/ML
40 INJECTION, SUSPENSION INTRA-ARTICULAR; INTRALESIONAL; INTRAMUSCULAR; PARENTERAL; SOFT TISSUE ONCE
Status: COMPLETED | OUTPATIENT
Start: 2024-12-09 | End: 2024-12-09

## 2024-12-09 RX ADMIN — IOHEXOL 1 ML: 300 INJECTION, SOLUTION INTRAVENOUS at 15:18

## 2024-12-09 RX ADMIN — METHYLPREDNISOLONE ACETATE 40 MG: 40 INJECTION, SUSPENSION INTRA-ARTICULAR; INTRALESIONAL; INTRAMUSCULAR; SOFT TISSUE at 15:19

## 2024-12-09 RX ADMIN — LIDOCAINE HYDROCHLORIDE 4 ML: 10 INJECTION, SOLUTION EPIDURAL; INFILTRATION; INTRACAUDAL; PERINEURAL at 15:15

## 2024-12-09 RX ADMIN — BUPIVACAINE HYDROCHLORIDE 2 ML: 2.5 INJECTION, SOLUTION EPIDURAL; INFILTRATION; INTRACAUDAL at 15:19

## 2024-12-09 NOTE — DISCHARGE INSTR - LAB
Epidural Steroid Injection   WHAT YOU NEED TO KNOW:   An epidural steroid injection (MAGDALENA) is a procedure to inject steroid medicine into the epidural space. The epidural space is between your spinal cord and vertebrae. Steroids reduce inflammation and fluid buildup in your spine that may be causing pain. You may be given pain medicine along with the steroids.          ACTIVITY  Do not drive or operate machinery today.  No strenuous activity today - bending, lifting, etc.  You may resume normal activites starting tomorrow - start slowly and as tolerated.  You may shower today, but no tub baths or hot tubs.  You may have numbness for several hours from the local anesthetic. Please use caution and common sense, especially with weight-bearing activities.    CARE OF THE INJECTION SITE  If you have soreness or pain, apply ice to the area today (20 minutes on/20 minutes off).  Starting tomorrow, you may use warm, moist heat or ice if needed.  You may have an increase or change in your discomfort for 36-48 hours after your treatment.  Apply ice and continue with any pain medication you have been prescribed.  Notify the [facility] if you have any of the following: redness, drainage, swelling, headache, stiff neck or fever above 100°F.    SPECIAL INSTRUCTIONS  Our office will contact you in approximately 14 days for a progress report.    MEDICATIONS  Continue to take all routine medications.  Our office may have instructed you to hold some medications.    As no general anesthesia was used in today's procedure, you should not experience any side effects related to anesthesia.     If you are diabetic, the steroids used in today's injection may temporarily increase your blood sugar levels after the first few days after your injection. Please keep a close eye on your sugars and alert the doctor who manages your diabetes if your sugars are significantly high from your baseline or you are symptomatic.     If you have a  problem specifically related to your procedure, please call our office at [phone].  Problems not related to your procedure should be directed to your primary care physician.

## 2024-12-09 NOTE — H&P
History of Present Illness: The patient is a 34 y.o. female who presents with complaints of low back pain    Past Medical History:   Diagnosis Date    Abnormal Pap smear of cervix 07/2022 7/2022ASCUS with pos HPV-; [id number] neg, Biopsies negative    Bilateral ocular hypertension     Low back pain     Migraine     w/ aura only when using combined hormonal contraception    Sciatica     left sided       Past Surgical History:   Procedure Laterality Date    LUMBAR EPIDURAL INJECTION      2 MAGDALENA         Current Outpatient Medications:     buPROPion (WELLBUTRIN XL) 150 mg 24 hr tablet, TAKE ONE TABLET BY MOUTH ONCE DAILY IN THE MORNING, Disp: 30 tablet, Rfl: 11    dicyclomine (BENTYL) 10 mg capsule, Take 1 capsule (10 mg total) by mouth 4 (four) times a day (before meals and at bedtime), Disp: 60 capsule, Rfl: 3    magnesium 30 MG tablet, Take 30 mg by mouth 2 (two) times a day Pt unaware of dosage, Disp: , Rfl:     Nurtec 75 MG TBDP, TAKE ONE TABLET BY MOUTH AS NEEDED FOR MIGRAINE (MAX 1 TAB/24HR), Disp: 16 tablet, Rfl: 2    RIBOFLAVIN PO, Take 1 tablet by mouth daily, Disp: , Rfl:     rizatriptan (MAXALT) 10 mg tablet, TAKE ONE TABLET BY MOUTH AS NEEDED FOR MIGRAINE FOR 1 DOSE. MAY REPEAT IN 2 HOURS IF NO RELIEF, Disp: 10 tablet, Rfl: 2    Current Facility-Administered Medications:     bupivacaine (PF) (MARCAINE) 0.25 % injection 2 mL, 2 mL, Epidural, Once, Dada Seo DO    iohexol (OMNIPAQUE) 300 mg/mL injection 50 mL, 50 mL, Epidural, Once, Dada Seo DO    lidocaine (PF) (XYLOCAINE-MPF) 1 % injection 4 mL, 4 mL, Infiltration, Once, Dada Seo DO    methylPREDNISolone acetate (DEPO-MEDROL) injection 40 mg, 40 mg, Perineural, Once, Dada Seo DO    Allergies   Allergen Reactions    Treenut [Nuts - Food Allergy] Swelling     Tree nuts       Physical Exam:   Vitals:    12/09/24 1506   BP: 134/84   Pulse: 81   Resp: 20   Temp: 98.4 °F (36.9 °C)   SpO2: 95%     General: Awake,  Alert, Oriented x 3, Mood and affect appropriate  Respiratory: Respirations even and unlabored  Cardiovascular: Peripheral pulses intact; no edema  Musculoskeletal Exam: Tenderness palpation bilateral lumbar paraspinals    ASA Score: 2    Patient/Chart Verification  Patient ID Verified: Verbal  ID Band Applied: No  Consents Confirmed: Procedural, To be obtained in the Pre-Procedure area  Interval H&P(within 24 hr) Complete (required for Outpatients and Surgery Admit only): To be obtained in the Procedural area  Allergies Reviewed: Yes  Anticoag/NSAID held?: NA  Currently on antibiotics?: No  Pregnancy denied?: Yes    Assessment:   1. Degeneration of intervertebral disc of lumbosacral region with lower extremity pain    2. Sciatica of left side associated with disorder of lumbar spine        Plan: Bilateral L4-L5 TFESI

## 2024-12-23 ENCOUNTER — TELEPHONE (OUTPATIENT)
Dept: PAIN MEDICINE | Facility: CLINIC | Age: 34
End: 2024-12-23

## 2025-01-07 ENCOUNTER — TELEPHONE (OUTPATIENT)
Age: 35
End: 2025-01-07

## 2025-01-07 NOTE — TELEPHONE ENCOUNTER
Pt called to request a RAMIREZ due to difference of opinion, She requested a provider who sees patients preferably in [place]. Okay to schedule OVL ?

## 2025-01-09 NOTE — TELEPHONE ENCOUNTER
Heather Rigler Ashley Cruz  Caller: Unspecified (2 days ago,  3:59 PM)  Can schedule with either Fernandez or SHIRA          Previous Messages       ----- Message -----  From: Elke Mayo  Sent: 1/8/2025   2:01 PM EST  To: Glenny Acevedo MA; Callie Agrawal PA-C; *  Subject: RE: ARMIREZ                                          Pt clarified and said there were communication issues and she would feel more comfortable being seen by another provider.  ----- Message -----  From: Callie Agrawal PA-C  Sent: 1/8/2025  10:26 AM EST  To: Glenny Acevedo MA; Heather Rigler; Elke Mayo  Subject: RE: RAMIREZ                                          Is there more information about opinion differences? If the opinion is medical then I am not sure it is a good reason to have RAMIREZ. Wondering if leadership can contribute to this?  Thanks.  ----- Message -----  From: Elke Mayo  Sent: 1/7/2025   4:42 PM EST  To: DIXIE Johns; *  Subject: RAMIREZ                                              Ok to schedule RAMIREZ OVL with you as pt prefers [place] headache provider ?  ----- Message -----  From: David Morelos PA-C  Sent: 1/7/2025   4:12 PM EST  To: Elke Mayo    Sure that is okay with me. Ok to RAMIREZ.    -Darrell  ----- Message -----  From: Elke Mayo  Sent: 1/7/2025   4:02 PM EST  To: David Morelos PA-C

## 2025-01-28 DIAGNOSIS — G43.909 MIGRAINE WITHOUT STATUS MIGRAINOSUS, NOT INTRACTABLE, UNSPECIFIED MIGRAINE TYPE: ICD-10-CM

## 2025-01-28 RX ORDER — RIZATRIPTAN BENZOATE 10 MG/1
TABLET ORAL
Qty: 10 TABLET | Refills: 2 | Status: SHIPPED | OUTPATIENT
Start: 2025-01-28

## 2025-04-03 ENCOUNTER — TELEPHONE (OUTPATIENT)
Dept: NEUROLOGY | Facility: CLINIC | Age: 35
End: 2025-04-03

## 2025-04-03 NOTE — TELEPHONE ENCOUNTER
Called patient to confirm upcoming appointment on 4/14/25  with Fernandez Shane in the [place] office. Pt cx the appt. Offered to r/s, pt stated she will call back to r/s

## 2025-04-16 DIAGNOSIS — G43.909 MIGRAINE WITHOUT STATUS MIGRAINOSUS, NOT INTRACTABLE, UNSPECIFIED MIGRAINE TYPE: ICD-10-CM

## 2025-04-17 RX ORDER — RIZATRIPTAN BENZOATE 10 MG/1
TABLET ORAL
Qty: 10 TABLET | Refills: 2 | Status: SHIPPED | OUTPATIENT
Start: 2025-04-17

## 2025-06-14 ENCOUNTER — TELEMEDICINE (OUTPATIENT)
Dept: OTHER | Facility: HOSPITAL | Age: 35
End: 2025-06-14
Payer: COMMERCIAL

## 2025-06-14 DIAGNOSIS — N39.0 URINARY TRACT INFECTION WITHOUT HEMATURIA, SITE UNSPECIFIED: Primary | ICD-10-CM

## 2025-06-14 PROCEDURE — 99213 OFFICE O/P EST LOW 20 MIN: CPT | Performed by: PHYSICIAN ASSISTANT

## 2025-06-14 RX ORDER — CEPHALEXIN 500 MG/1
500 CAPSULE ORAL EVERY 8 HOURS SCHEDULED
Qty: 21 CAPSULE | Refills: 0 | Status: SHIPPED | OUTPATIENT
Start: 2025-06-14 | End: 2025-06-21

## 2025-06-14 NOTE — PROGRESS NOTES
Virtual Regular Visit  Name: Renae Mancia      : 1990      MRN: [id number]  Encounter Provider: Alejandrina Angulo PA-C  Encounter Date: 2025   Encounter department: VIRTUAL CARE       Verification of patient location:  Patient is located at Home in the following state in which I hold an active license [place] :  Assessment & Plan  Urinary tract infection without hematuria, site unspecified    Orders:    UA w Reflex to Microscopic w Reflex to Culture; Future    cephalexin (KEFLEX) 500 mg capsule; Take 1 capsule (500 mg total) by mouth every 8 (eight) hours for 7 days        Encounter provider Alejandrina Angulo PA-C    The patient was identified by name and date of birth. Renae Mancia was informed that this is a telemedicine visit and that the visit is being conducted through the Epic Embedded platform. She agrees to proceed..  My office door was closed. No one else was in the room. She acknowledged consent and understanding of privacy and security of the video platform. The patient has agreed to participate and understands they can discontinue the visit at any time.    Patient is aware this is a billable service.     History obtained from: patient  History of Present Illness     34 y.o. female presents for evaluation of burning with urination, bladder/pelvic pressure increased urinary frequency and urgency. Denies fever, chills, N/V/D, SOB, CP, HA, blurry vision dizziness, back pain, abdominal pain, blood in urine.       Review of Systems   Constitutional:  Negative for fever.   Gastrointestinal:  Negative for abdominal pain, nausea and vomiting.   Genitourinary:  Positive for dysuria, frequency and pelvic pain. Negative for decreased urine volume, flank pain, hematuria and urgency.   All other systems reviewed and are negative.      Objective   There were no vitals taken for this visit.    Physical Exam  Constitutional:       General: She is not in acute distress.     Appearance: Normal appearance.  She is not ill-appearing or toxic-appearing.   HENT:      Head: Normocephalic and atraumatic.      Right Ear: External ear normal.      Left Ear: External ear normal.      Nose: Nose normal.     Eyes:      Extraocular Movements: Extraocular movements intact.      Conjunctiva/sclera: Conjunctivae normal.     Pulmonary:      Effort: Pulmonary effort is normal. No respiratory distress.      Breath sounds: No stridor. No wheezing.     Neurological:      Mental Status: She is alert and oriented to person, place, and time. Mental status is at baseline.     Psychiatric:         Mood and Affect: Mood normal.         Behavior: Behavior normal.         Thought Content: Thought content normal.         Judgment: Judgment normal.         Visit Time  Total Visit Duration: 7 minutes not including the time spent for establishing the audio/video connection.

## 2025-06-25 ENCOUNTER — TELEPHONE (OUTPATIENT)
Age: 35
End: 2025-06-25

## 2025-07-02 ENCOUNTER — OFFICE VISIT (OUTPATIENT)
Dept: OBGYN CLINIC | Facility: CLINIC | Age: 35
End: 2025-07-02
Payer: COMMERCIAL

## 2025-07-02 VITALS
HEIGHT: 63 IN | DIASTOLIC BLOOD PRESSURE: 76 MMHG | SYSTOLIC BLOOD PRESSURE: 110 MMHG | BODY MASS INDEX: 33.66 KG/M2 | WEIGHT: 190 LBS

## 2025-07-02 DIAGNOSIS — R87.610 ASCUS WITH POSITIVE HIGH RISK HPV CERVICAL: ICD-10-CM

## 2025-07-02 DIAGNOSIS — Z01.411 ENCOUNTER FOR GYNECOLOGICAL EXAMINATION WITH ABNORMAL FINDING: Primary | ICD-10-CM

## 2025-07-02 DIAGNOSIS — N90.7 VULVAR CYST: ICD-10-CM

## 2025-07-02 DIAGNOSIS — Z12.4 SCREENING FOR CERVICAL CANCER: ICD-10-CM

## 2025-07-02 DIAGNOSIS — R87.810 ASCUS WITH POSITIVE HIGH RISK HPV CERVICAL: ICD-10-CM

## 2025-07-02 PROCEDURE — S0612 ANNUAL GYNECOLOGICAL EXAMINA: HCPCS | Performed by: NURSE PRACTITIONER

## 2025-07-02 RX ORDER — PROCHLORPERAZINE MALEATE 10 MG
5-10 TABLET ORAL EVERY 6 HOURS PRN
COMMUNITY
Start: 2025-07-01

## 2025-07-02 RX ORDER — CANDESARTAN 8 MG/1
8 TABLET ORAL DAILY
COMMUNITY
Start: 2025-02-15

## 2025-07-02 RX ORDER — NARATRIPTAN 2.5 MG/1
TABLET ORAL
COMMUNITY
Start: 2025-07-01

## 2025-07-02 RX ORDER — FREMANEZUMAB-VFRM 225 MG/1.5ML
225 INJECTION SUBCUTANEOUS
COMMUNITY
Start: 2025-07-01

## 2025-07-02 NOTE — PATIENT INSTRUCTIONS
"Patient Education     Diet and health   The Basics   Written by the doctors and editors at [facility]   Why is it important to eat a healthy diet? -- It's important to eat a healthy diet because eating the right foods can keep you healthy now and later in life. It can lower the risk of problems like heart disease, diabetes, high blood pressure, and some types of cancer. It can also help you live longer and improve your quality of life.  What kind of diet is best? -- There is no 1 specific diet that experts recommend for everyone. People choose what foods to eat for many different reasons. These include personal preference, culture, Orthodoxy, allergies or intolerances, and nutritional goals. People also need to consider the cost and availability of different foods.  In general, experts recommend a diet that:   Includes lots of vegetables, fruits, beans, nuts, and whole grains   Limits red and processed meats, unhealthy fats, sugar, salt, and alcohol  What are dietary patterns? -- A dietary \"pattern\" means generally eating certain types of foods while limiting others. Some people need to follow a specific dietary pattern because of their health needs. For example, if you have high blood pressure, your doctor might recommend a diet low in salt.  If you are trying to improve your health in general, choosing a healthy dietary pattern can help. This does not have to mean being very strict about what you eat or avoid. The goal is to think about getting plenty of healthy foods while limiting less healthy foods.  Examples of dietary patterns include:   Mediterranean diet - This involves eating a lot of fruits, vegetables, nuts, and whole grains, and uses olive oil instead of other fats. It also includes some fish, poultry, and dairy products, but not a lot of red meat. Following this diet can help your overall health, and might even lower your risk of having a stroke.   Plant-based diets - These patterns focus on vegetables, " "fruits, grains, beans, and nuts. They limit or avoid food that comes from animals, such as meat and dairy. There are different types of plant-based diets, including vegetarian and vegan.   Low-fat diet - A low-fat diet involves limiting calories from fat. This might help some people keep weight off if that is their goal, but it does not have many other health benefits. If you choose to follow a low-fat diet, it is also important to focus on getting lots of whole grains, legumes, fruits, and vegetables. Limit refined grains and sugar.   Low-cholesterol diet - Cholesterol is found in foods with a lot of saturated fat, like red meat, butter, and cheese. A low-cholesterol diet focuses on limiting the amount of cholesterol that you eat. Limiting the cholesterol in your diet can also help lower the amount of unhealthy fats that you eat.  Which foods are especially healthy? -- Foods that are especially healthy include:   Fruits and vegetables - Eating a diet with lots of fruits and vegetables can help prevent heart disease and stroke. It might also help prevent certain types of cancer. Try to eat fruits and vegetables at each meal and also for snacks. If you don't have fresh fruits and vegetables available, you can eat frozen or canned ones instead. Doctors recommend eating at least 5 servings of fruits or vegetables each day.   Whole grains - Whole-grain foods include 100 percent whole-wheat bread, steel cut oats, and whole-grain pasta. These are healthier than foods made with \"refined\" grains, like white bread and white rice. Eating lots of whole grains instead of refined grains has been shown to help with weight control. It can also lower the risk of several health problems, including colon cancer, heart disease, and diabetes. Doctors recommend that most people try to eat 5 to 8 servings of whole-grain, high-fiber foods each day.   Foods with fiber - Eating foods with a lot of fiber can help prevent heart disease and " "stroke. If you have type 2 diabetes, it can also help control your blood sugar. Foods that have a lot of fiber include vegetables, fruits, beans, nuts, oatmeal, and whole-grain breads and cereals. You can tell how much fiber is in a food by reading the nutrition label (figure 1). Doctors recommend that most people eat about 25 to 34 grams of fiber each day.   Foods with calcium and vitamin D - Babies, children, and adults need calcium and vitamin D to help keep their bones strong. Adults also need calcium and vitamin D to help prevent osteoporosis. Osteoporosis is a condition that causes bones to get \"thin\" and break more easily than usual. Different foods and drinks have calcium and vitamin D in them (figure 2). People who don't get enough calcium and vitamin D in their diet might need to take a supplement. Doctors recommend that most people have 2 to 3 servings of foods with calcium and vitamin D each day.   Foods with protein - Protein helps your muscles and bones stay strong. Healthy foods with a lot of protein include chicken, fish, eggs, beans, nuts, and soy products. Red meat also has a lot of protein, but it also contains fats, which can be unhealthy. Doctors recommend that most people try to eat about 5 servings of protein each day.   Healthy fats - There are different types of fats. Some types of fats are better for your body than others. Healthy fats are \"monounsaturated\" or \"polyunsaturated\" fats. These are found in fatty fish, nuts and nut butters, and avocados. Use plant-based oils when cooking. Examples of these oils include olive, canola, safflower, sunflower, and corn oil. Eating foods with healthy fats, while avoiding or limiting foods with unhealthy fats, might lower the risk of heart disease.   Foods with folate - Folate is a vitamin that is important for pregnant people, since it helps prevent certain birth defects. It is also called \"folic acid.\" Anyone who could get pregnant should get at " "least 400 micrograms of folic acid daily, whether or not they are actively trying to get pregnant. Folate is found in many breakfast cereals, oranges, orange juice, and green leafy vegetables.  What foods should I avoid or limit? -- To eat a healthy diet, there are some things that you should avoid or limit. They include:   Unhealthy fats - \"Trans\" fats are especially unhealthy. They are found in margarines, many fast foods, and some store-bought baked goods. \"Saturated\" fats are found in animal products like meats, egg yolks, butter, cheese, and full-fat milk products. Unhealthy fats can raise your cholesterol level and increase your chance of getting heart disease.   Sugar - To have a healthy diet, it's important to limit or avoid added sugar, sweets, and refined grains. Refined grains are found in white bread, white rice, most pastas, and most packaged \"snack\" foods.  Avoiding sugar-sweetened beverages, like soda and sports drinks, can also help improve your health.  Avoid canned fruits in \"heavy\" syrup.   Red and processed meats - Studies have shown that eating a lot of red meat can increase your risk of certain health problems, including heart disease and cancer. You should limit the amount of red meat that you eat. This is also true for processed meats like sausage, hot dogs, and dominguez.  Can I drink alcohol as part of a healthy diet? -- Not drinking alcohol at all is the healthiest choice. Regular drinking can raise a person's chances of getting liver disease and certain types of cancers. In females, even 1 drink a day can increase the risk of getting breast cancer.  If you do choose to drink, most doctors recommend limiting alcohol to no more than:   1 drink a day for females   2 drinks a day for males  The limits are different because, generally, the female body takes longer to break down alcohol.  How many calories do I need each day? -- Calories give your body energy. The number of calories that you need " "each day depends on your weight, height, age, sex, and how active you are.  Your doctor or nurse can tell you about how many calories you should eat each day. You can also work with a dietitian (nutrition expert) to learn more about your dietary needs and options.  What if I am having trouble improving my diet? -- It can be hard to change the way that you eat. Remember that even small changes can improve your health.  Here are some tips that might help:   Try to make fruits and vegetables part of every meal. If you don't have fresh fruits and vegetables, frozen or canned are good options. Look for products without added salt or sugar.   Keep a bowl of fruit out for snacking.   When you can, choose whole grains instead of refined grains. Choose chicken, fish, and beans instead of red meat and cheese.   Try to eat prepared and processed foods less often.   Try flavored seltzer or water instead of soda or juice.   When eating at fast food restaurants, look for healthier items, like broiled chicken or salad.  If you have questions about which foods you should or should not eat, ask your doctor, nurse, or dietitian. The right diet for you will depend, in part, on your health and any medical conditions you have.  All topics are updated as new evidence becomes available and our peer review process is complete.  This topic retrieved from [employer] on: Feb 28, 2024.  Topic 25955 Version 28.0  Release: 32.2.4 - C32.58  © 2024 UpToDate, Inc. and/or its affiliates. All rights reserved.  figure 1: Nutrition label - Fiber     This is an example of a nutrition label. To figure out how much fiber is in a food, look for the line that says \"Dietary Fiber.\" It's also important to look at the serving size. This food has 7 grams of fiber in each serving, and each serving is 1 cup.  Graphic 24402 Version 8.0  figure 2: Foods and drinks with calcium and vitamin D     Foods rich in calcium include ice cream, soy milk, breads, kale, " "broccoli, milk, cheese, cottage cheese, almonds, yogurt, ready-to-eat cereals, beans, and tofu. Foods rich in vitamin D include milk, fortified plant-based \"milks\" (soy, almond), canned tuna fish, cod liver oil, yogurt, ready-to-eat-cereals, cooked salmon, canned sardines, mackerel, and eggs. Some of these foods are rich in both.  Graphic 88250 Version 4.0  Consumer Information Use and Disclaimer   Disclaimer: This generalized information is a limited summary of diagnosis, treatment, and/or medication information. It is not meant to be comprehensive and should be used as a tool to help the user understand and/or assess potential diagnostic and treatment options. It does NOT include all information about conditions, treatments, medications, side effects, or risks that may apply to a specific patient. It is not intended to be medical advice or a substitute for the medical advice, diagnosis, or treatment of a health care provider based on the health care provider's examination and assessment of a patient's specific and unique circumstances. Patients must speak with a health care provider for complete information about their health, medical questions, and treatment options, including any risks or benefits regarding use of medications. This information does not endorse any treatments or medications as safe, effective, or approved for treating a specific patient. UpToDate, Inc. and its affiliates disclaim any warranty or liability relating to this information or the use thereof.The use of this information is governed by the Terms of Use, available at https://www.wolterskluwer.com/en/know/clinical-effectiveness-terms. 2024© UpToDate, Inc. and its affiliates and/or licensors. All rights reserved.  Copyright   © 2024 UpToDate, Inc. and/or its affiliates. All rights reserved.  Patient Education     Calcium Rich Diet   About this topic   Calcium is one of the most important minerals and is found in almost all parts of your " body. It makes your teeth and bones strong and healthy. Your body does not make calcium. It is important for you to eat calcium rich foods to get enough calcium. It also helps your body:  Make blood clots  Keep your heartbeat normal  Helps blood move throughout the body  Release hormones  Release enzymes  Send and get signals between your nerves and brain  Make muscles work well         What will the results be?   It is important to have a good amount of calcium in your food. Calcium helps your body work well. It is very important during every life stage. Calcium may also keep you from losing bone mass. This is osteopenia. It may help keep you from having weak bones. This is osteoporosis.  What drugs may be needed?   The doctor may order calcium and vitamin D supplements. You need vitamin D to help your body take in the calcium. Your calcium supplement may have vitamin D in it.  Talk to your doctor about:  If it is OK to take your calcium with food.  How often to take your calcium supplement throughout the day.  All the drugs you are taking. Be sure to include all prescription and over-the-counter (OTC) drugs, and herbal supplements. Tell the doctor about any drug allergy. Bring a list of drugs you take with you. Some drugs may cause problems with how your body takes in calcium.  Will physical activity be limited?   No, physical activities will not be limited. It is good for you to do light exercises and to stay active.  What changes to diet are needed?   You will need to watch how much calcium is in the foods you eat. Your doctor will talk to you about the right amount of calcium for you. How much calcium you need is based on your age and life stage.  When is this diet used?   This diet is used when your normal diet is low in calcium. It is needed to raise the amount of calcium in your diet. Our bodies do not take in calcium well as we get older.  Who should not use this diet?   People with too much calcium in  their blood should not use this diet. This is called hypercalcemia.  What foods are good to eat?   Milk, yogurt, and cheese products are naturally high in calcium.  These foods have smaller amounts of calcium. If they are eaten often and in large amounts they can be good sources of calcium:  Oysters; dried fruit like figs and raisins; green leafy vegetables like broccoli, collards, kale, mustard greens, bok yaneth; soy beans; oranges  [place] and sardines. Be sure to eat the soft bones.  Nuts like almonds and [place] nuts, sunflower seeds, tahini, dried beans  Food products with calcium added to them like orange juice, tofu, cereal, bread; almond milk, rice milk, soy milk  What foods should be limited or avoided?   People who eat many kinds of foods do not have to be worried about limiting or avoiding certain foods.   What problems could happen?   Some people may get kidney stones. This may happen after taking high amounts of calcium over a long time. Calcium from food does not seem to cause kidney stones.  Hard stools.  Too much calcium may interfere with your body’s ability to absorb iron and zinc.  When do I need to call the doctor?   Call your doctor if you have concerns about your diet. Tell your doctor if you are allergic to any of the foods in your diet.  Helpful tips   If you have problems digesting milk, try lactose-free milk. You may also use a product to help you take in lactose.  Talk with your doctor if you are a vegetarian and do not eat dairy products. Your doctor can help you make sure you get the amount of calcium your body needs.  Last Reviewed Date   2021-03-12  Consumer Information Use and Disclaimer   This generalized information is a limited summary of diagnosis, treatment, and/or medication information. It is not meant to be comprehensive and should be used as a tool to help the user understand and/or assess potential diagnostic and treatment options. It does NOT include all information about  conditions, treatments, medications, side effects, or risks that may apply to a specific patient. It is not intended to be medical advice or a substitute for the medical advice, diagnosis, or treatment of a health care provider based on the health care provider's examination and assessment of a patient’s specific and unique circumstances. Patients must speak with a health care provider for complete information about their health, medical questions, and treatment options, including any risks or benefits regarding use of medications. This information does not endorse any treatments or medications as safe, effective, or approved for treating a specific patient. UpToDate, Inc. and its affiliates disclaim any warranty or liability relating to this information or the use thereof. The use of this information is governed by the Terms of Use, available at https://www.BetaUsersNow.com.com/en/know/clinical-effectiveness-terms   Copyright   Copyright © 2024 UpToDate, Inc. and its affiliates and/or licensors. All rights reserved.  Patient Education     Exercise and movement   The Basics   Written by the doctors and editors at [employer]   What are the benefits of movement? -- Moving your body has many benefits. It can:   Burn calories, which helps people manage their weight   Help control blood sugar levels in people with diabetes   Lower blood pressure, especially in people with high blood pressure   Lower stress, and help with depression and anxiety   Keep bones strong, so they don't get thin and break easily   Lower the chance of dying from heart disease  Adding even small amounts of physical activity to your daily routine can improve your health.  What are the main types of exercise? -- There are 3 main types of exercise:   Aerobic exercise - This raises your heart rate. Examples include walking, running, dancing, riding a bike, and swimming.   Muscle strengthening - This helps make your muscles stronger. You can do it using  weights, exercise bands, or weight machines. You can also use your own body weight, as with push-ups, or by lifting items in your home, like jugs of water.   Stretching - These help your muscles and joints move more easily.  It's important to have all 3 types in your exercise program. That way, your body, muscles, and joints can be as healthy as possible.  Should I talk to my doctor or nurse before I start exercising? -- If you have not exercised before or have not exercised in a long time, talk with your doctor or nurse before you start a very active exercise program.  If you have heart disease or risk factors for heart disease (like high blood pressure or diabetes), your doctor or nurse might recommend that you have an exercise test before starting an exercise program.  When you begin an exercise program, start slowly. For example, do the exercise at a slow pace or for a few minutes only. Over time, you can exercise faster and for longer periods of time.  What should I do when I exercise? -- Each time you exercise, you should:   Warm up - This can help keep you from hurting your muscles when you exercise. To warm up, do a light aerobic exercise (such as walking slowly) or stretch for 5 to 10 minutes.   Work out - Try to get a mix of aerobic exercise, muscle strengthening, and stretching. During an aerobic workout, you can walk fast, swim, run, or use an exercise machine, for example. Other activities, like dancing or playing tennis, are also forms of aerobic exercise. You should also take time to stretch all of your joints, including your neck, shoulders, back, hips, and knees. At least 2 times a week, you can do muscle strengthening exercises as part of your workout.   Cool down - This helps keep you from feeling dizzy after you exercise and helps prevent muscle cramps. To cool down, you can stretch or do a light aerobic exercise for 5 minutes.  Some people go to a gym or do group exercise classes. But you can  exercise even without these things. Some exercises can be done even in a small space. You can also try online videos or smartphone apps to get ideas for different types of exercise.  How often should I exercise? -- Doctors recommend that people exercise at least 30 minutes a day, on 5 or more days of the week.  If you can't exercise for 30 minutes straight, try to exercise for 10 minutes at a time, 3 or 4 times a day. Even exercising for shorter amounts of time is good for you, especially if it means spending less time sitting.  When should I call my doctor or nurse? -- If you have any of the following symptoms when you exercise, stop exercising and call your doctor or nurse right away:   Pain or pressure in your chest, arms, throat, jaw, or back   Nausea or vomiting   Feeling like your heart is fluttering or racing very fast   Feeling dizzy or faint  What if I don't have time to exercise? -- Many people have very busy lives and might not think that they have time to exercise. But it's important to try to find time, even if you are tired or work a lot. Exercise can increase your energy level, which can make you feel better and might even help you get more work done.  Even if it's hard to set aside a lot of time to exercise, you can still improve your health by moving your body more. There are many ways that you can be more active. For example, you can:   Take the stairs instead of the elevator.   Park in a parking space that is farther away from the door.   Take a longer route when you walk from one place to another.  Spending a lot of time sitting still (for example, watching TV or working on the computer) is bad for your health. Try to get up and move around whenever you can. Even small amounts of movement, like taking short walks, doing household chores, or gardening, can improve your health. Finding activities that you enjoy, or doing them with other people, can help you add more movement into your daily  life.  What else should I do when I exercise? -- To exercise safely and avoid problems, it's important to:   Drink fluids during and after exercising (but avoid drinks with a lot of caffeine or sugar).   Avoid exercising outside if it is too hot or cold.   Wear layers of clothes, so that you can take them off if you get too hot.   Wear shoes that fit well and support your feet.   Be aware of your surroundings if you exercise outside.  All topics are updated as new evidence becomes available and our peer review process is complete.  This topic retrieved from [employer] on: May 18, 2024.  Topic 76462 Version 31.0  Release: 32.4.3 - C32.137  © 2024 UpToDate, Inc. and/or its affiliates. All rights reserved.  Consumer Information Use and Disclaimer   Disclaimer: This generalized information is a limited summary of diagnosis, treatment, and/or medication information. It is not meant to be comprehensive and should be used as a tool to help the user understand and/or assess potential diagnostic and treatment options. It does NOT include all information about conditions, treatments, medications, side effects, or risks that may apply to a specific patient. It is not intended to be medical advice or a substitute for the medical advice, diagnosis, or treatment of a health care provider based on the health care provider's examination and assessment of a patient's specific and unique circumstances. Patients must speak with a health care provider for complete information about their health, medical questions, and treatment options, including any risks or benefits regarding use of medications. This information does not endorse any treatments or medications as safe, effective, or approved for treating a specific patient. UpToDate, Inc. and its affiliates disclaim any warranty or liability relating to this information or the use thereof.The use of this information is governed by the Terms of Use, available at  https://www.woltersLean Trainuwer.com/en/know/clinical-effectiveness-terms. 2024© [employer] and its affiliates and/or licensors. All rights reserved.  Copyright   © 2024 [employer] and/or its affiliates. All rights reserved.

## 2025-07-02 NOTE — PROGRESS NOTES
Assessment / Plan    Assessment & Plan  Encounter for gynecological examination with abnormal finding  Normal well woman exam.  BC: withdrawal/rhythm       ASCUS with positive high risk HPV cervical  Pap with HPV collected.  Discussed importance of surveillance of paps.  11/2022 Colpo: path negative; advised to repeat pap 11/2023 however this was not completed.  7/2022 pap ASCUS with pos other HPV      Orders:    IGP, Aptima HPV, Rfx 16/18,45    Vulvar cyst  Left labia, blocked gland.  Advised hot compresses, 3x a day x one week.  If continues to grow and/or becomes painful return for I&D           Subjective      Renae Mancia is a 34 y.o. G0 female who presents for her annual gynecologic exam.    Last seen by me 7/2022 for yearly.    7/2022 pap ASCUS with pos other HPV  11/2022 Colpo: path negative; advised to repeat pap 11/2023 however this was not completed.  STD screening: --  STD hx: HPV  Sexually active: yes, spouse, 6 yrs   Condom use: no  Nutrition: Body mass index is 33.66 kg/m².; given guidelines  Calcium: given guidelines  Exercise: 2x/ wk, given guidelines  Safety: feels safe    Periods are regular   Current contraception: coitus interruptus and rhythm method  History of abnormal Pap smear: yes  Family history of breast,uterine, ovarian or colon cancer: no      The following portions of the patient's history were reviewed and updated as appropriate: allergies, current medications, past family history, past medical history, past social history, past surgical history, and problem list.    Review of Systems      Review of Systems   Constitutional:  Negative for chills and fever.   Respiratory:  Negative for cough and shortness of breath.    Gastrointestinal:  Negative for abdominal distention, abdominal pain, blood in stool, constipation, diarrhea, nausea and vomiting.   Genitourinary:  Positive for genital sores (notes a cyst on left labia). Negative for difficulty urinating, dysuria, frequency,  "hematuria, menstrual problem, pelvic pain, urgency, vaginal bleeding and vaginal discharge.   Musculoskeletal:  Negative for arthralgias and myalgias.     Breasts:  Negative for skin changes, dimpling, asymmetry, nipple discharge, redness, tenderness or palpable masses    Objective     *patient agrees to exam without a chaperone present.     /76 (BP Location: Left arm, Patient Position: Sitting, Cuff Size: Standard)   Ht 5' 3\" (1.6 m)   Wt 86.2 kg (190 lb)   LMP 06/06/2025 (Exact Date)   BMI 33.66 kg/m²   Physical Exam  Constitutional:       General: She is not in acute distress.     Appearance: Normal appearance. She is well-developed. She is not ill-appearing or diaphoretic.      Comments: Body mass index is 33.66 kg/m².     HENT:      Head: Normocephalic and atraumatic.     Eyes:      Pupils: Pupils are equal, round, and reactive to light.     Neck:      Thyroid: No thyromegaly.   Pulmonary:      Effort: Pulmonary effort is normal.   Chest:   Breasts:     Breasts are symmetrical.      Right: No inverted nipple, mass, nipple discharge, skin change or tenderness.      Left: No inverted nipple, mass, nipple discharge, skin change or tenderness.   Abdominal:      General: There is no distension.      Palpations: Abdomen is soft. There is no mass.      Tenderness: There is no abdominal tenderness. There is no guarding or rebound.   Genitourinary:     General: Normal vulva.      Exam position: Lithotomy position.      Labia:         Right: No rash, tenderness, lesion or injury.         Left: No rash, tenderness, lesion or injury.       Vagina: No signs of injury and foreign body. No vaginal discharge, erythema, tenderness or bleeding.      Cervix: No cervical motion tenderness, discharge or friability.      Uterus: Not enlarged and not tender.       Adnexa:         Right: No mass or tenderness.          Left: No mass or tenderness.        Rectum: Normal.        Comments: ~ 15 mm smooth subcutaneous blocked " gland, tender    Musculoskeletal:      Cervical back: Neck supple.   Lymphadenopathy:      Cervical: No cervical adenopathy.      Upper Body:      Right upper body: No supraclavicular adenopathy.      Left upper body: No supraclavicular adenopathy.     Skin:     General: Skin is warm and dry.     Neurological:      General: No focal deficit present.      Mental Status: She is alert and oriented to person, place, and time.     Psychiatric:         Mood and Affect: Mood normal.         Behavior: Behavior normal.         Thought Content: Thought content normal.         Judgment: Judgment normal.

## 2025-07-04 DIAGNOSIS — G43.909 MIGRAINE WITHOUT STATUS MIGRAINOSUS, NOT INTRACTABLE, UNSPECIFIED MIGRAINE TYPE: ICD-10-CM

## 2025-07-06 RX ORDER — RIZATRIPTAN BENZOATE 10 MG/1
TABLET ORAL
Qty: 10 TABLET | Refills: 2 | Status: SHIPPED | OUTPATIENT
Start: 2025-07-06

## 2025-07-07 PROBLEM — R87.619 ABNORMAL PAP SMEAR OF CERVIX: Status: ACTIVE | Noted: 2022-07-01

## 2025-07-07 LAB
CYTOLOGIST CVX/VAG CYTO: NORMAL
DX ICD CODE: NORMAL
HPV GENOTYPE REFLEX: NORMAL
HPV I/H RISK 4 DNA CVX QL PROBE+SIG AMP: NEGATIVE
OTHER STN SPEC: NORMAL
PATH REPORT.FINAL DX SPEC: NORMAL
SL AMB NOTE:: NORMAL
SL AMB SPECIMEN ADEQUACY: NORMAL
SL AMB TEST METHODOLOGY: NORMAL